# Patient Record
Sex: FEMALE | Race: WHITE | NOT HISPANIC OR LATINO | Employment: OTHER | URBAN - METROPOLITAN AREA
[De-identification: names, ages, dates, MRNs, and addresses within clinical notes are randomized per-mention and may not be internally consistent; named-entity substitution may affect disease eponyms.]

---

## 2017-01-31 ENCOUNTER — OFFICE VISIT (OUTPATIENT)
Dept: NEUROSURGERY | Facility: CLINIC | Age: 32
End: 2017-01-31

## 2017-01-31 VITALS
DIASTOLIC BLOOD PRESSURE: 80 MMHG | BODY MASS INDEX: 32.47 KG/M2 | WEIGHT: 172 LBS | HEART RATE: 80 BPM | HEIGHT: 61 IN | SYSTOLIC BLOOD PRESSURE: 124 MMHG

## 2017-01-31 DIAGNOSIS — G89.29 CHRONIC LOW BACK PAIN WITHOUT SCIATICA, UNSPECIFIED BACK PAIN LATERALITY: ICD-10-CM

## 2017-01-31 DIAGNOSIS — M54.50 CHRONIC LOW BACK PAIN WITHOUT SCIATICA, UNSPECIFIED BACK PAIN LATERALITY: ICD-10-CM

## 2017-01-31 DIAGNOSIS — M47.12 CERVICAL SPONDYLOSIS WITH MYELOPATHY: ICD-10-CM

## 2017-01-31 DIAGNOSIS — R32 INCONTINENCE: Primary | ICD-10-CM

## 2017-01-31 PROCEDURE — 99203 OFFICE O/P NEW LOW 30 MIN: CPT | Performed by: NEUROLOGICAL SURGERY

## 2017-01-31 RX ORDER — GABAPENTIN 300 MG/1
300 CAPSULE ORAL 3 TIMES DAILY
COMMUNITY
End: 2022-09-08

## 2017-01-31 RX ORDER — GLUCOSAMINE HCL 500 MG
TABLET ORAL
COMMUNITY
End: 2022-08-18

## 2017-01-31 RX ORDER — VENLAFAXINE 50 MG/1
50 TABLET ORAL 2 TIMES DAILY
COMMUNITY
End: 2022-08-18

## 2017-01-31 NOTE — MR AVS SNAPSHOT
Katie Villarreal   2017 1:00 PM   Office Visit    Dept Phone:  [phone]   Encounter #:  [id number]    Provider:  Harvey Pérez MD   Department:  [facility]                Your Full Care Plan              Your Updated Medication List          This list is accurate as of: 17  1:42 PM.  Always use your most recent med list.                cyclobenzaprine 10 MG tablet   Commonly known as:  FLEXERIL       diclofenac 50 MG EC tablet   Commonly known as:  VOLTAREN       gabapentin 300 MG capsule   Commonly known as:  NEURONTIN       venlafaxine 50 MG tablet   Commonly known as:  EFFEXOR       Vitamin D3 3000 UNITS tablet               You Were Diagnosed With        Codes Comments    Incontinence    -  Primary ICD-10-CM: R32  ICD-9-CM: 788.30     Cervical spondylosis with myelopathy     ICD-10-CM: M47.12  ICD-9-CM: 721.1     Chronic low back pain without sciatica, unspecified back pain laterality     ICD-10-CM: M54.5, G89.29  ICD-9-CM: 724.2, 338.29       Instructions     None    Patient Instructions History      Upcoming Appointments     Visit Type Date Time Department    NEW PATIENT 2017  1:00 PM [facility]    OFFICE VISIT 3/9/2017  9:45 AM [facility]      Image [employer] Signup     [employer] allows you to send messages to your doctor, view your test results, renew your prescriptions, schedule appointments, and more. To sign up, go to "[employer] and click on the Sign Up Now link in the New User? box. Enter your [employer] Activation Code exactly as it appears below along with the last four digits of your Social Security Number and your Date of Birth () to complete the sign-up process. If you do not sign up before the expiration date, you must request a new code.    [employer] Local Activation Code: [email or web address]  Expires: 2017  1:39 PM    If you have questions, you can email [email or web address] or call  "[phone] to talk to our MyChart staff. Remember, MyChart is NOT to be used for urgent needs. For medical emergencies, dial 911.               Other Info from Your Visit           Your Appointments     Mar 09, 2017  9:45 AM EST   Office Visit with ALCIRA Asencio   [facility] (--)    [address]  [facility] [address] [phone]           Arrive 15 minutes prior to appointment.              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

## 2017-01-31 NOTE — PROGRESS NOTES
Subjective   Patient ID: Katie Villarreal is a 31 y.o. female is being seen for consultation today at the request of ALCIRA Adame for low back pain. She is accompanied by her mother for this visit today.     History of Present Illness     It is a 31-year-old right-handed woman who noted the onset of low back pain when she had a fall slipping onto her buttocks.  She is also noted incontinence of urine that is not related specifically to physical activities.  She is also noted right hand and right toe numbness and tingling.    He denies problems like this prior to her fall about 6 months ago.  Her active low back pain is described as sharp pain.  The patient indicates that she has constant pain regardless of what activity she is doing.  Even being seated causes her pain.  No specific aggravating factors.  He feels as if he makes her back pain better.     Once that at times she has hesitancy and in fact inability to urinate while on the toilet.  She also notes at times just specific leakage of urine without a precipitating factor.  This is been going on for at least 6 months.    He is been treated with gabapentin.  Not had physical therapy.    Currently disabled with bipolar and mood disorders.    The following portions of the patient's history were reviewed and updated as appropriate: allergies, current medications, past family history, past medical history, past social history, past surgical history and problem list.    Review of Systems   Constitutional: Negative for chills and fever.   Respiratory: Negative for cough and shortness of breath.    Cardiovascular: Negative for chest pain, palpitations and leg swelling.   Gastrointestinal: Positive for constipation. Negative for abdominal pain.   Genitourinary: Positive for enuresis. Negative for difficulty urinating.   Musculoskeletal: Positive for back pain and gait problem. Negative for neck pain.   Skin: Negative for rash.   Neurological: Positive for  numbness (or tingling RLE). Negative for weakness.   Hematological: Does not bruise/bleed easily.   Psychiatric/Behavioral: Positive for sleep disturbance.       Objective   Physical Exam   Constitutional: She is oriented to person, place, and time. She appears well-developed and well-nourished. She is cooperative.   HENT:   Head: Normocephalic and atraumatic.   Eyes: EOM are normal. Pupils are equal, round, and reactive to light. No scleral icterus.   Neck: Normal range of motion. Neck supple.   Cardiovascular: Normal rate, regular rhythm and intact distal pulses.    No murmur heard.  Pulmonary/Chest: Effort normal and breath sounds normal.   Abdominal: Soft. Bowel sounds are normal. There is no tenderness.   Musculoskeletal: Normal range of motion.   Neurological: She is alert and oriented to person, place, and time. She has normal strength. She displays no atrophy. No cranial nerve deficit or sensory deficit. She exhibits normal muscle tone. She has a normal Finger-Nose-Finger Test, a normal Heel to Shin Test, a normal Romberg Test and a normal Tandem Gait Test. She displays a negative Romberg sign. Gait normal. Coordination and gait normal. GCS eye subscore is 4. GCS verbal subscore is 5. GCS motor subscore is 6.   Reflex Scores:       Tricep reflexes are 3+ on the right side and 3+ on the left side.       Bicep reflexes are 3+ on the right side and 3+ on the left side.       Brachioradialis reflexes are 3+ on the right side and 3+ on the left side.       Patellar reflexes are 3+ on the right side and 3+ on the left side.       Achilles reflexes are 3+ on the right side and 3+ on the left side.  Negative Guadarrama and clonus  Finger to nose intact   Heel to shin intact  Able to tandem walk  Able to walk on heels and toes   Skin: Skin is warm, dry and intact.   Psychiatric: She has a normal mood and affect. Her speech is normal and behavior is normal. Judgment and thought content normal. Cognition and memory are  normal.   Vitals reviewed.    Neurologic Exam     Mental Status   Oriented to person, place, and time.   Registration: recalls 3 of 3 objects. Recall at 5 minutes: recalls 3 of 3 objects. Follows 3 step commands.   Attention: normal. Concentration: normal.   Speech: speech is normal   Level of consciousness: alert  Knowledge: good.   Able to name object. Able to read. Able to repeat. Able to write. Normal comprehension.     Cranial Nerves     CN II   Visual fields full to confrontation.     CN III, IV, VI   Pupils are equal, round, and reactive to light.  Extraocular motions are normal.   Right pupil: Consensual response: intact.   Left pupil: Consensual response: intact.   CN III: no CN III palsy  CN VI: no CN VI palsy  Nystagmus: none   Diplopia: none  Ophthalmoparesis: none  Upgaze: normal  Downgaze: normal  Conjugate gaze: present  Vestibulo-ocular reflex: present    CN V   Facial sensation intact.     CN VII   Facial expression full, symmetric.     CN VIII   CN VIII normal.     CN IX, X   CN IX normal.     CN XI   CN XI normal.     CN XII   CN XII normal.     Motor Exam   Muscle bulk: normal  Overall muscle tone: normal  Right arm tone: normal  Left arm tone: normal  Right arm pronator drift: absent  Left arm pronator drift: absent  Right leg tone: normal  Left leg tone: normal    Strength   Strength 5/5 throughout.   Right neck flexion: 5/5  Left neck flexion: 5/5  Right neck extension: 5/5  Left neck extension: 5/5  Right deltoid: 5/5  Left deltoid: 5/5  Right biceps: 5/5  Left biceps: 5/5  Right triceps: 5/5  Left triceps: 5/5  Right wrist flexion: 5/5  Left wrist flexion: 5/5  Right wrist extension: 5/5  Left wrist extension: 5/5  Right interossei: 5/5  Left interossei: 5/5  Right abdominals: 5/5  Left abdominals: 5/5  Right iliopsoas: 5/5  Left iliopsoas: 5/5  Right quadriceps: 5/5  Left quadriceps: 5/5  Right hamstrin/5  Left hamstrin/5  Right glutei: 5/5  Left glutei: 5/5  Right anterior tibial:  5/5  Left anterior tibial: 5/5  Right posterior tibial: 5/5  Left posterior tibial: 5/5  Right peroneal: 5/5  Left peroneal: 5/5  Right gastroc: 5/5  Left gastroc: 5/5    Sensory Exam   Light touch normal.   Vibration normal.   Proprioception normal.   Pinprick normal.     Gait, Coordination, and Reflexes     Gait  Gait: normal    Coordination   Romberg: negative  Finger to nose coordination: normal  Heel to shin coordination: normal  Tandem walking coordination: normal    Tremor   Resting tremor: absent  Intention tremor: absent  Action tremor: absent    Reflexes   Right brachioradialis: 3+  Left brachioradialis: 3+  Right biceps: 3+  Left biceps: 3+  Right triceps: 3+  Left triceps: 3+  Right patellar: 3+  Left patellar: 3+  Right achilles: 3+  Left achilles: 3+  Right plantar: normal  Left plantar: normal  Right Guadarrama: present  Left Guadarrama: present  Right ankle clonus: absent  Left ankle clonus: absent       Highly antalgic gait entering the right leg able to toe heel and tandem walk       Assessment/Plan   Independent Review of Radiographic Studies:    I personally reviewed an MRI scan of lumbar spine that essentially is within normal limits.  I see no evidence of cauda equina compression or of significant disc disease or of neural foraminal or canal stenosis.  Medical Decision Making:    The patient presents with the above-noted history and physical findings.  She is very hyperreflexic and she has been disturbing symptom of urinary disorder.    I think that is reasonable and appropriate to reevaluate with MRI her thoracic and cervical spine.  These are negative then I will not have much to offer her.  If there is evidence of cord compression and of course would be appropriate to consider decompression.    Katei was seen today for back pain.    Diagnoses and all orders for this visit:    Incontinence  -     MRI Cervical Spine With & Without Contrast; Future  -     MRI thoracic spine w wo contrast;  Future    Cervical spondylosis with myelopathy  -     MRI Cervical Spine With & Without Contrast; Future  -     MRI thoracic spine w wo contrast; Future    Chronic low back pain without sciatica, unspecified back pain laterality    Return for Follow up after testing, Follow up with nurse practitioner.

## 2017-02-27 ENCOUNTER — APPOINTMENT (OUTPATIENT)
Dept: MRI IMAGING | Facility: HOSPITAL | Age: 32
End: 2017-02-27
Attending: NEUROLOGICAL SURGERY

## 2017-02-27 ENCOUNTER — HOSPITAL ENCOUNTER (OUTPATIENT)
Dept: MRI IMAGING | Facility: HOSPITAL | Age: 32
Discharge: HOME | End: 2017-02-27
Attending: NEUROLOGICAL SURGERY

## 2017-02-27 ENCOUNTER — HOSPITAL ENCOUNTER (OUTPATIENT)
Dept: MRI IMAGING | Facility: HOSPITAL | Age: 32
Discharge: HOME | End: 2017-02-27
Attending: NEUROLOGICAL SURGERY | Admitting: NEUROLOGICAL SURGERY

## 2017-02-27 DIAGNOSIS — R32 INCONTINENCE: ICD-10-CM

## 2017-02-27 DIAGNOSIS — M47.12 CERVICAL SPONDYLOSIS WITH MYELOPATHY: ICD-10-CM

## 2017-02-27 PROCEDURE — 0 GADOBENATE DIMEGLUMINE 529 MG/ML SOLUTION: Performed by: NEUROLOGICAL SURGERY

## 2017-02-27 PROCEDURE — 72156 MRI NECK SPINE W/O & W/DYE: CPT

## 2017-02-27 PROCEDURE — A9577 INJ MULTIHANCE: HCPCS | Performed by: NEUROLOGICAL SURGERY

## 2017-02-27 PROCEDURE — 72157 MRI CHEST SPINE W/O & W/DYE: CPT

## 2017-02-27 RX ADMIN — GADOBENATE DIMEGLUMINE 15 ML: 529 INJECTION, SOLUTION INTRAVENOUS at 11:21

## 2017-12-15 ENCOUNTER — CONVERSION ENCOUNTER (OUTPATIENT)
Dept: GENERAL RADIOLOGY | Facility: HOSPITAL | Age: 32
End: 2017-12-15

## 2019-03-04 ENCOUNTER — HOSPITAL ENCOUNTER (OUTPATIENT)
Dept: URGENT CARE | Facility: CLINIC | Age: 34
Discharge: HOME | End: 2019-03-04
Attending: PHYSICIAN ASSISTANT

## 2020-08-17 ENCOUNTER — HOSPITAL ENCOUNTER (OUTPATIENT)
Dept: GENERAL RADIOLOGY | Facility: HOSPITAL | Age: 35
Discharge: HOME | End: 2020-08-17
Attending: NURSE PRACTITIONER

## 2021-07-24 ENCOUNTER — HOSPITAL ENCOUNTER (EMERGENCY)
Facility: HOSPITAL | Age: 36
Discharge: ANOTHER HOSPITAL | End: 2021-07-24
Attending: EMERGENCY MEDICINE | Admitting: EMERGENCY MEDICINE

## 2021-07-24 ENCOUNTER — APPOINTMENT (OUTPATIENT)
Dept: GENERAL RADIOLOGY | Facility: HOSPITAL | Age: 36
End: 2021-07-24

## 2021-07-24 VITALS
WEIGHT: 128.31 LBS | BODY MASS INDEX: 23.61 KG/M2 | RESPIRATION RATE: 16 BRPM | TEMPERATURE: 98.1 F | DIASTOLIC BLOOD PRESSURE: 76 MMHG | OXYGEN SATURATION: 100 % | SYSTOLIC BLOOD PRESSURE: 114 MMHG | HEART RATE: 94 BPM | HEIGHT: 62 IN

## 2021-07-24 DIAGNOSIS — M65.9 TENOSYNOVITIS: Primary | ICD-10-CM

## 2021-07-24 LAB
ALBUMIN SERPL-MCNC: 4.3 G/DL (ref 3.5–5.2)
ALBUMIN/GLOB SERPL: 1.4 G/DL
ALP SERPL-CCNC: 72 U/L (ref 39–117)
ALT SERPL W P-5'-P-CCNC: 11 U/L (ref 1–33)
ANION GAP SERPL CALCULATED.3IONS-SCNC: 11.2 MMOL/L (ref 5–15)
ANISOCYTOSIS BLD QL: NORMAL
AST SERPL-CCNC: 17 U/L (ref 1–32)
BASOPHILS # BLD AUTO: 0.03 10*3/MM3 (ref 0–0.2)
BASOPHILS NFR BLD AUTO: 0.3 % (ref 0–1.5)
BILIRUB SERPL-MCNC: 0.6 MG/DL (ref 0–1.2)
BUN SERPL-MCNC: 9 MG/DL (ref 6–20)
BUN/CREAT SERPL: 12.7 (ref 7–25)
CALCIUM SPEC-SCNC: 9.1 MG/DL (ref 8.6–10.5)
CHLORIDE SERPL-SCNC: 100 MMOL/L (ref 98–107)
CO2 SERPL-SCNC: 25.8 MMOL/L (ref 22–29)
CREAT SERPL-MCNC: 0.71 MG/DL (ref 0.57–1)
DEPRECATED RDW RBC AUTO: 45.2 FL (ref 37–54)
EOSINOPHIL # BLD AUTO: 0.27 10*3/MM3 (ref 0–0.4)
EOSINOPHIL NFR BLD AUTO: 3.1 % (ref 0.3–6.2)
ERYTHROCYTE [DISTWIDTH] IN BLOOD BY AUTOMATED COUNT: 14.4 % (ref 12.3–15.4)
ERYTHROCYTE [SEDIMENTATION RATE] IN BLOOD: 9 MM/HR (ref 0–20)
GFR SERPL CREATININE-BSD FRML MDRD: 94 ML/MIN/1.73
GLOBULIN UR ELPH-MCNC: 3 GM/DL
GLUCOSE SERPL-MCNC: 96 MG/DL (ref 65–99)
HCT VFR BLD AUTO: 37.6 % (ref 34–46.6)
HGB BLD-MCNC: 12.8 G/DL (ref 12–15.9)
HYPOCHROMIA BLD QL: NORMAL
IMM GRANULOCYTES # BLD AUTO: 0.03 10*3/MM3 (ref 0–0.05)
IMM GRANULOCYTES NFR BLD AUTO: 0.3 % (ref 0–0.5)
LYMPHOCYTES # BLD AUTO: 2.7 10*3/MM3 (ref 0.7–3.1)
LYMPHOCYTES NFR BLD AUTO: 31.1 % (ref 19.6–45.3)
MACROCYTES BLD QL SMEAR: NORMAL
MCH RBC QN AUTO: 29.5 PG (ref 26.6–33)
MCHC RBC AUTO-ENTMCNC: 34 G/DL (ref 31.5–35.7)
MCV RBC AUTO: 86.6 FL (ref 79–97)
MONOCYTES # BLD AUTO: 0.67 10*3/MM3 (ref 0.1–0.9)
MONOCYTES NFR BLD AUTO: 7.7 % (ref 5–12)
NEUTROPHILS NFR BLD AUTO: 4.98 10*3/MM3 (ref 1.7–7)
NEUTROPHILS NFR BLD AUTO: 57.5 % (ref 42.7–76)
NRBC BLD AUTO-RTO: 0 /100 WBC (ref 0–0.2)
PLATELET # BLD AUTO: 325 10*3/MM3 (ref 140–450)
PMV BLD AUTO: 10.4 FL (ref 6–12)
POIKILOCYTOSIS BLD QL SMEAR: NORMAL
POTASSIUM SERPL-SCNC: 3.6 MMOL/L (ref 3.5–5.2)
PROT SERPL-MCNC: 7.3 G/DL (ref 6–8.5)
RBC # BLD AUTO: 4.34 10*6/MM3 (ref 3.77–5.28)
SMALL PLATELETS BLD QL SMEAR: ADEQUATE
SODIUM SERPL-SCNC: 137 MMOL/L (ref 136–145)
TARGETS BLD QL SMEAR: NORMAL
WBC # BLD AUTO: 8.68 10*3/MM3 (ref 3.4–10.8)
WBC MORPH BLD: NORMAL

## 2021-07-24 PROCEDURE — 99283 EMERGENCY DEPT VISIT LOW MDM: CPT

## 2021-07-24 PROCEDURE — 80053 COMPREHEN METABOLIC PANEL: CPT | Performed by: NURSE PRACTITIONER

## 2021-07-24 PROCEDURE — 96376 TX/PRO/DX INJ SAME DRUG ADON: CPT

## 2021-07-24 PROCEDURE — 85652 RBC SED RATE AUTOMATED: CPT | Performed by: NURSE PRACTITIONER

## 2021-07-24 PROCEDURE — 25010000002 TDAP 5-2.5-18.5 LF-MCG/0.5 SUSPENSION: Performed by: NURSE PRACTITIONER

## 2021-07-24 PROCEDURE — 87070 CULTURE OTHR SPECIMN AEROBIC: CPT | Performed by: NURSE PRACTITIONER

## 2021-07-24 PROCEDURE — 90715 TDAP VACCINE 7 YRS/> IM: CPT | Performed by: NURSE PRACTITIONER

## 2021-07-24 PROCEDURE — 96375 TX/PRO/DX INJ NEW DRUG ADDON: CPT

## 2021-07-24 PROCEDURE — 90471 IMMUNIZATION ADMIN: CPT | Performed by: NURSE PRACTITIONER

## 2021-07-24 PROCEDURE — 87077 CULTURE AEROBIC IDENTIFY: CPT | Performed by: NURSE PRACTITIONER

## 2021-07-24 PROCEDURE — 25010000002 MORPHINE PER 10 MG: Performed by: EMERGENCY MEDICINE

## 2021-07-24 PROCEDURE — 25010000002 ONDANSETRON PER 1 MG: Performed by: EMERGENCY MEDICINE

## 2021-07-24 PROCEDURE — 25010000002 PIPERACILLIN SOD-TAZOBACTAM PER 1 G: Performed by: NURSE PRACTITIONER

## 2021-07-24 PROCEDURE — 85007 BL SMEAR W/DIFF WBC COUNT: CPT | Performed by: NURSE PRACTITIONER

## 2021-07-24 PROCEDURE — 96365 THER/PROPH/DIAG IV INF INIT: CPT

## 2021-07-24 PROCEDURE — 85025 COMPLETE CBC W/AUTO DIFF WBC: CPT | Performed by: NURSE PRACTITIONER

## 2021-07-24 PROCEDURE — 87205 SMEAR GRAM STAIN: CPT | Performed by: NURSE PRACTITIONER

## 2021-07-24 PROCEDURE — 73130 X-RAY EXAM OF HAND: CPT

## 2021-07-24 RX ORDER — ONDANSETRON 2 MG/ML
4 INJECTION INTRAMUSCULAR; INTRAVENOUS ONCE
Status: COMPLETED | OUTPATIENT
Start: 2021-07-24 | End: 2021-07-24

## 2021-07-24 RX ADMIN — ONDANSETRON 4 MG: 2 INJECTION INTRAMUSCULAR; INTRAVENOUS at 14:12

## 2021-07-24 RX ADMIN — MORPHINE SULFATE 4 MG: 4 INJECTION, SOLUTION INTRAMUSCULAR; INTRAVENOUS at 16:02

## 2021-07-24 RX ADMIN — SODIUM CHLORIDE 1000 ML: 9 INJECTION, SOLUTION INTRAVENOUS at 12:17

## 2021-07-24 RX ADMIN — TAZOBACTAM SODIUM AND PIPERACILLIN SODIUM 3.38 G: 375; 3 INJECTION, SOLUTION INTRAVENOUS at 12:18

## 2021-07-24 RX ADMIN — MORPHINE SULFATE 4 MG: 4 INJECTION, SOLUTION INTRAMUSCULAR; INTRAVENOUS at 14:12

## 2021-07-24 RX ADMIN — TETANUS TOXOID, REDUCED DIPHTHERIA TOXOID AND ACELLULAR PERTUSSIS VACCINE, ADSORBED 0.5 ML: 5; 2.5; 8; 8; 2.5 SUSPENSION INTRAMUSCULAR at 14:13

## 2021-07-24 NOTE — ED PROVIDER NOTES
Subjective   Patient presents to the ER for a dog bite to right hand.    This occurred two days ago. The dog was a stray dog that was bothering her dog. She tried to wave the dog off and sustained a bite.     The area is now draining. She has not been seen for this.     Unknown last tetnus.    Denies fever.           Review of Systems   Constitutional: Negative for chills and fever.   HENT: Negative for congestion, ear pain and sore throat.    Eyes: Negative for pain.   Respiratory: Negative for cough, chest tightness and shortness of breath.    Cardiovascular: Negative for chest pain.   Gastrointestinal: Negative for abdominal pain, diarrhea, nausea and vomiting.   Genitourinary: Negative for flank pain and hematuria.   Musculoskeletal: Positive for myalgias. Negative for joint swelling.   Skin: Negative for pallor.   Neurological: Negative for seizures and headaches.   All other systems reviewed and are negative.      Past Medical History:   Diagnosis Date   • Anxiety    • Depression    • Lumbar back pain    • Spleen anomaly        No Known Allergies    Past Surgical History:   Procedure Laterality Date   • SPLENECTOMY         Family History   Problem Relation Age of Onset   • Diabetes Maternal Grandmother    • Heart disease Maternal Grandmother    • Cancer Maternal Grandfather    • Heart disease Maternal Grandfather        Social History     Socioeconomic History   • Marital status: Single     Spouse name: Not on file   • Number of children: Not on file   • Years of education: Not on file   • Highest education level: Not on file   Tobacco Use   • Smoking status: Current Every Day Smoker     Packs/day: 1.00     Years: 15.00     Pack years: 15.00   Substance and Sexual Activity   • Alcohol use: No           Objective   Physical Exam  Musculoskeletal:        Arms:          Procedures           ED Course                                           MDM  Number of Diagnoses or Management Options  Tenosynovitis  Diagnosis  management comments: Patient presents to the ER after sustaining an injury from a dog to the volar, proximal 4th digit. She was not seen by a provider and has an open wound.    Flexor tenosynitis present.    Blood work does not show any significant findings.    Antibioitics, Tdap and pain medication in the ER.     Dr. Belcher evaluated Patient and agrees with plan of care.    I consulted [facility]. Patient will be transferred and accepting physician on the EMTLA.        Amount and/or Complexity of Data Reviewed  Clinical lab tests: reviewed  Tests in the radiology section of CPT®: reviewed        Final diagnoses:   Tenosynovitis       ED Disposition  ED Disposition     ED Disposition Condition Comment    Transfer to Another Facility   Patient is being transferred to [facility]. Patient is traveling POV.           No follow-up provider specified.       Medication List      No changes were made to your prescriptions during this visit.          Caroline Perry, ALCIRA  07/25/21 1127

## 2021-07-24 NOTE — ED PROVIDER NOTES
"Subjective   Patient is a 36-yo female who presents to the ED with c/o animal bite 2 days ago to her left hand.  This was a provoked attack as the patient states that she tried to \"shoe\" the dog away.          Review of Systems   Constitutional: Negative for chills and fever.   HENT: Negative for nosebleeds.    Eyes: Negative for redness.   Respiratory: Negative for cough and shortness of breath.    Cardiovascular: Negative for chest pain.   Gastrointestinal: Negative for diarrhea and vomiting.   Genitourinary: Negative for dysuria and frequency.   Musculoskeletal: Negative for back pain and neck pain.   Skin: Negative for rash.   Neurological: Negative for seizures and syncope.   All other systems reviewed and are negative.      Past Medical History:   Diagnosis Date   • Anxiety    • Depression    • Lumbar back pain    • Spleen anomaly        No Known Allergies    Past Surgical History:   Procedure Laterality Date   • SPLENECTOMY         Family History   Problem Relation Age of Onset   • Diabetes Maternal Grandmother    • Heart disease Maternal Grandmother    • Cancer Maternal Grandfather    • Heart disease Maternal Grandfather        Social History     Socioeconomic History   • Marital status: Single     Spouse name: Not on file   • Number of children: Not on file   • Years of education: Not on file   • Highest education level: Not on file   Tobacco Use   • Smoking status: Current Every Day Smoker     Packs/day: 1.00     Years: 15.00     Pack years: 15.00   Substance and Sexual Activity   • Alcohol use: No         Objective   Physical Exam  Vitals and nursing note reviewed.   Constitutional:       General: She is awake. She is not in acute distress.  HENT:      Head: Normocephalic and atraumatic.      Nose: Nose normal.      Mouth/Throat:      Mouth: Mucous membranes are moist.   Eyes:      General: No scleral icterus.     Extraocular Movements: Extraocular movements intact.      Conjunctiva/sclera: Conjunctivae " normal.      Pupils: Pupils are equal, round, and reactive to light.   Cardiovascular:      Rate and Rhythm: Normal rate and regular rhythm.      Pulses: Normal pulses.      Heart sounds: Normal heart sounds. No murmur heard.     Pulmonary:      Effort: Pulmonary effort is normal. No respiratory distress.      Breath sounds: Normal breath sounds and air entry. No decreased air movement. No decreased breath sounds, wheezing, rhonchi or rales.   Chest:      Chest wall: No tenderness.   Abdominal:      Palpations: Abdomen is soft.      Tenderness: There is no abdominal tenderness. There is no guarding or rebound.      Hernia: No hernia is present.   Musculoskeletal:         General: No tenderness. Normal range of motion.      Left hand: Swelling and laceration present.      Cervical back: Normal range of motion and neck supple. No tenderness.      Right lower leg: No edema.      Left lower leg: No edema.      Comments: Laceration to left dip joint on palm side. Thick drainage and swelling. Pain with active flexion and extension. Will not attempt passive flexion or extension. Redness to palm and wrist. Warmth. Tenderness.    Skin:     General: Skin is warm and dry.      Findings: No rash.   Neurological:      General: No focal deficit present.      Mental Status: She is alert and oriented to person, place, and time. Mental status is at baseline.      Sensory: Sensation is intact. No sensory deficit.      Motor: Motor function is intact. No weakness.   Psychiatric:         Behavior: Behavior normal.         Procedures         ED Course                                            MDM    Final diagnoses:   Tenosynovitis       Documentation assistance provided by tanika Menard.  Information recorded by the tanika was done at my direction and has been verified and validated by me.     Vero Menard  07/24/21 1319       Aaron Belcher DO  07/24/21 4254

## 2021-07-27 LAB
BACTERIA SPEC AEROBE CULT: ABNORMAL
GRAM STN SPEC: ABNORMAL

## 2022-09-08 PROCEDURE — 87660 TRICHOMONAS VAGIN DIR PROBE: CPT | Performed by: NURSE PRACTITIONER

## 2022-09-08 PROCEDURE — 87086 URINE CULTURE/COLONY COUNT: CPT | Performed by: NURSE PRACTITIONER

## 2022-09-08 PROCEDURE — 87491 CHLMYD TRACH DNA AMP PROBE: CPT | Performed by: NURSE PRACTITIONER

## 2022-09-08 PROCEDURE — 87510 GARDNER VAG DNA DIR PROBE: CPT | Performed by: NURSE PRACTITIONER

## 2022-09-08 PROCEDURE — 87591 N.GONORRHOEAE DNA AMP PROB: CPT | Performed by: NURSE PRACTITIONER

## 2022-09-08 PROCEDURE — U0004 COV-19 TEST NON-CDC HGH THRU: HCPCS | Performed by: NURSE PRACTITIONER

## 2022-09-08 PROCEDURE — 87480 CANDIDA DNA DIR PROBE: CPT | Performed by: NURSE PRACTITIONER

## 2022-09-09 ENCOUNTER — TELEPHONE (OUTPATIENT)
Dept: URGENT CARE | Facility: CLINIC | Age: 37
End: 2022-09-09

## 2022-09-09 DIAGNOSIS — B96.89 BACTERIAL VAGINOSIS: Primary | ICD-10-CM

## 2022-09-09 DIAGNOSIS — N76.0 BACTERIAL VAGINOSIS: Primary | ICD-10-CM

## 2022-09-09 RX ORDER — METRONIDAZOLE 500 MG/1
500 TABLET ORAL 2 TIMES DAILY
Qty: 14 TABLET | Refills: 0 | Status: SHIPPED | OUTPATIENT
Start: 2022-09-09 | End: 2022-09-09

## 2022-09-09 RX ORDER — METRONIDAZOLE 500 MG/1
500 TABLET ORAL 2 TIMES DAILY
Qty: 14 TABLET | Refills: 0 | Status: SHIPPED | OUTPATIENT
Start: 2022-09-09 | End: 2022-09-16

## 2022-09-12 ENCOUNTER — HOSPITAL ENCOUNTER (EMERGENCY)
Facility: HOSPITAL | Age: 37
Discharge: HOME | End: 2022-09-12
Attending: EMERGENCY MEDICINE | Admitting: EMERGENCY MEDICINE

## 2022-09-12 VITALS
WEIGHT: 146 LBS | SYSTOLIC BLOOD PRESSURE: 114 MMHG | BODY MASS INDEX: 26.87 KG/M2 | OXYGEN SATURATION: 100 % | TEMPERATURE: 98.1 F | HEART RATE: 88 BPM | DIASTOLIC BLOOD PRESSURE: 69 MMHG | HEIGHT: 62 IN | RESPIRATION RATE: 20 BRPM

## 2022-09-12 DIAGNOSIS — S01.511A LACERATION OF VERMILION BORDER OF UPPER LIP WITHOUT COMPLICATION, INITIAL ENCOUNTER: ICD-10-CM

## 2022-09-12 DIAGNOSIS — S01.511A LIP LACERATION, INITIAL ENCOUNTER: Primary | ICD-10-CM

## 2022-09-12 PROCEDURE — 99283 EMERGENCY DEPT VISIT LOW MDM: CPT

## 2022-09-12 PROCEDURE — 63710000001 ONDANSETRON ODT 4 MG TABLET DISPERSIBLE: Performed by: EMERGENCY MEDICINE

## 2022-09-12 RX ORDER — HYDROCODONE BITARTRATE AND ACETAMINOPHEN 5; 325 MG/1; MG/1
1 TABLET ORAL EVERY 6 HOURS PRN
Status: DISCONTINUED | OUTPATIENT
Start: 2022-09-12 | End: 2022-09-12 | Stop reason: HOSPADM

## 2022-09-12 RX ORDER — LIDOCAINE HYDROCHLORIDE AND EPINEPHRINE 10; 10 MG/ML; UG/ML
10 INJECTION, SOLUTION INFILTRATION; PERINEURAL ONCE
Status: COMPLETED | OUTPATIENT
Start: 2022-09-12 | End: 2022-09-12

## 2022-09-12 RX ORDER — GINSENG 100 MG
1 CAPSULE ORAL ONCE
Status: COMPLETED | OUTPATIENT
Start: 2022-09-12 | End: 2022-09-12

## 2022-09-12 RX ORDER — ONDANSETRON 4 MG/1
4 TABLET, ORALLY DISINTEGRATING ORAL ONCE
Status: COMPLETED | OUTPATIENT
Start: 2022-09-12 | End: 2022-09-12

## 2022-09-12 RX ADMIN — Medication 1 APPLICATION: at 04:47

## 2022-09-12 RX ADMIN — HYDROCODONE BITARTRATE AND ACETAMINOPHEN 1 TABLET: 5; 325 TABLET ORAL at 04:47

## 2022-09-12 RX ADMIN — LIDOCAINE HYDROCHLORIDE AND EPINEPHRINE 10 ML: 10; 10 INJECTION, SOLUTION INFILTRATION; PERINEURAL at 04:47

## 2022-09-12 RX ADMIN — ONDANSETRON 4 MG: 4 TABLET, ORALLY DISINTEGRATING ORAL at 04:47

## 2022-09-12 NOTE — ED PROVIDER NOTES
Subjective   PIT    The patient presents to the emergency department with a proximately a 1.5 cm laceration to the right upper lip.  It is through the vermilion border.  It is gapped wide.  The patient states that she was running up some steps when she tripped and hit her mouth on a plate she was carrying when it broke. She is able to bite down without any difficulties and discomfort.  She is able to open her mouth completely and close it without any difficulties.  She is able to stick her tongue out without any problem.  She states she does not feel as if she has any loose teeth or any other injuries.  Her airway is patent.  She states she is up-to-date with her immunizations.  The bleeding is controlled.  There is no obvious bruising at this time.  She states that she did not have any loss of consciousness.      History provided by:  Patient   used: No        Review of Systems   Constitutional: Negative for chills and fever.   HENT: Negative for congestion, ear pain and sore throat.    Eyes: Negative for pain.   Respiratory: Negative for cough, chest tightness and shortness of breath.    Cardiovascular: Negative for chest pain.   Gastrointestinal: Negative for abdominal pain, diarrhea, nausea and vomiting.   Genitourinary: Negative for flank pain and hematuria.   Musculoskeletal: Negative for joint swelling.   Skin: Positive for wound. Negative for pallor.   Neurological: Negative for seizures and headaches.   All other systems reviewed and are negative.      Past Medical History:   Diagnosis Date   • Anxiety    • Bipolar 1 disorder (HCC)    • Depression    • Lumbar back pain    • Spleen anomaly        No Known Allergies    Past Surgical History:   Procedure Laterality Date   • SPLENECTOMY     • TUBAL ABDOMINAL LIGATION         Family History   Problem Relation Age of Onset   • Diabetes Maternal Grandmother    • Heart disease Maternal Grandmother    • Cancer Maternal Grandfather    • Heart  disease Maternal Grandfather        Social History     Socioeconomic History   • Marital status: Single   Tobacco Use   • Smoking status: Current Every Day Smoker     Packs/day: 1.00     Years: 15.00     Pack years: 15.00   • Smokeless tobacco: Never Used   Vaping Use   • Vaping Use: Never used   Substance and Sexual Activity   • Alcohol use: No   • Drug use: Never           Objective   Physical Exam  Vitals and nursing note reviewed.   Constitutional:       General: She is not in acute distress.     Appearance: Normal appearance. She is not ill-appearing or toxic-appearing.   HENT:      Head: Normocephalic.      Mouth/Throat:      Lips: Pink.      Mouth: Mucous membranes are moist. Injury and lacerations present.     Eyes:      General: No scleral icterus.  Cardiovascular:      Rate and Rhythm: Normal rate and regular rhythm.      Pulses: Normal pulses.   Pulmonary:      Effort: Pulmonary effort is normal.   Abdominal:      General: Abdomen is flat.   Musculoskeletal:         General: No swelling or tenderness. Normal range of motion.      Cervical back: Normal range of motion and neck supple. No rigidity or tenderness.      Right lower leg: No edema.      Left lower leg: No edema.   Lymphadenopathy:      Cervical: No cervical adenopathy.   Skin:     General: Skin is warm and dry.      Capillary Refill: Capillary refill takes less than 2 seconds.      Findings: No rash.   Neurological:      General: No focal deficit present.      Mental Status: She is alert and oriented to person, place, and time. Mental status is at baseline.   Psychiatric:         Mood and Affect: Mood normal.         Behavior: Behavior normal.         Laceration Repair    Date/Time: 9/12/2022 5:00 AM  Performed by: Kisha Das APRN  Authorized by: Sejal Paul MD     Consent:     Consent obtained:  Verbal    Consent given by:  Patient    Risks, benefits, and alternatives were discussed: yes      Risks discussed:  Infection, pain and  poor cosmetic result    Alternatives discussed:  No treatment  Universal protocol:     Procedure explained and questions answered to patient or proxy's satisfaction: yes      Relevant documents present and verified: no      Test results available: no      Imaging studies available: no      Required blood products, implants, devices, and special equipment available: no      Site/side marked: no      Immediately prior to procedure, a time out was called: no      Patient identity confirmed:  Verbally with patient and arm band  Anesthesia:     Anesthesia method:  Local infiltration    Local anesthetic:  Lidocaine 1% WITH epi  Laceration details:     Location:  Lip  Pre-procedure details:     Preparation:  Patient was prepped and draped in usual sterile fashion  Exploration:     Limited defect created (wound extended): no      Hemostasis achieved with:  Direct pressure    Wound exploration: wound explored through full range of motion and entire depth of wound visualized      Contaminated: no    Post-procedure details:     Procedure completion:  Procedure terminated at patient's request  Comments:      The patient was informed, prior to the anesthetizing, that the numbing procedure would burn and feel like a bee sting for about 2 minutes.  After becoming upset and screaming, crying and cursing the patient requested to have the procedure stopped.  The procedure was terminated after 1 small lidocaine injection into her upper right lip.               ED Course  ED Course as of 09/12/22 0820   Mon Sep 12, 2022   0621 I was attempting to anesthetize the patient's right upper lip laceration.  I injected 1 time with a minute amount of lidocaine to the laceration.  The patient immediately began crying and shaking and screaming.  I tried to explain to her that we could numb it all at 1 time it would quit hurting within 2 minutes.  The patient then asked to speak with my boss and stated that I did not care about her and that she  "needed to be \"knocked out\" for the procedure.  I tried to explain to her that there are complications with  sedation, and this was a minor lack repair that could be done with probably 4 or 5 sutures.  The patient continued to scream and yell.  I exited the room and ask JENNA Dodge to go speak with the patient. [TC]   1334 JENNA Delaney went back in the room with me to speak with the patient concerning her lac repair.  I again offered to suture her lac.  She was informed that the oncoming provider at 7:00 could come and do perform her repai  but that there would still not be any additional pain narcotic pain medications or Valium administered.  The patient did not want to be seen and said she would go somewhere else.  Her significant other convinced her to stay here for the repair.  I did explain to her that she would have to cooperate.  Then the patient began making things up that I had thrown things at her in the room prior to leaving earlier.  She began screaming and sit up in the bed yelling and cussing calling me a bitch.  I exited the room.  [TC]   0627 JENNA Delaney return and stated the patient wanted to be sutured now and states she just wants to get it done and leave.  I will not continue to take care of this patient.  The 7 AM midlevel can choose to suture her if she wants. [TC]   0785 The patient has continued to scream and yell at staff and curse.  She has now told the charge nurse that \"you are not doing your fucking job\".  Dr. Paul went in to explain to the patient that it is not appropriate to disrespect the staff and speak to them in this manner.  The patient then asked her who she was to tell her what to do.  The patient will be discharged to follow-up somewhere else for her lac repair. [TC]      ED Course User Index  [TC] Kisha Das, ALCIRA                                           MDM  Number of Diagnoses or Management Options  Laceration of vermilion border of upper lip without " complication, initial encounter: minor  Lip laceration, initial encounter: minor  Risk of Complications, Morbidity, and/or Mortality  Presenting problems: low  Diagnostic procedures: low  Management options: low    Patient Progress  Patient progress: stable      Final diagnoses:   Lip laceration, initial encounter   Laceration of vermilion border of upper lip without complication, initial encounter       ED Disposition  ED Disposition     ED Disposition   Discharge    Condition   Stable    Comment   --             No follow-up provider specified.       Medication List      No changes were made to your prescriptions during this visit.          Kisha Das APRN  09/12/22 0801       Kisha Das APRN  09/12/22 0819       Kisha Das APRN  09/12/22 0820

## 2022-09-12 NOTE — ED NOTES
"Was asked to come and speak with patient by provider due to patient upset, yelling and cussing and upon patient request. This RN came to bedside to speak with patient. Patient upset due to feeling like the provider was rude. Patient asked if she could have additional pain medication or sedative for her lip laceration repair. Patient informed that I would speak with provider about additional pain medication and would inform provider of wanting a sedative. This RN went to provider and provider stated that patient had already received pain medication and that we would not being doing a sedation for simple laceration repair due to the risks that are involved and for patient safety. This RN went to explain to the patient why we would not be giving a sedative as per provider explanation and explained that she had already been given pain medication. Patient still upset and raising voice at that time. Patient visitor asked patient what she wanted to do. This RN informed patient that a new provider would be coming in a few minutes if she wanted to have another provider perform the laceration repair. Patient and visitor at that time decided that they just wanted to \"get it over with\" and stated that they were okay with original provider performing the laceration repair. This RN informed the provider and provider had this RN come with them to bedside to speak with the patient. Patient still upset and escalated started to yell at provider at that time when discussing as to why she felt the provider was rude. Provider left bedside at that time due to patient escalation. Patient continued to yell at that time. Patient also asked to get the \"number to head of the hospital\". This RN called  and was informed to give patient the Patient Advocate number. This RN wrote down \"Patient Advocate\" and their phone number as read off by [facility] [place] along with Providers name as requested. Went to give the information to the " "patient and informed them that a new provider would be coming in to do the laceration repair. Patient yelled at this RN \"I don't know why you can't just do your fucking job\" after again explaining why we don't sedate for simple laceration repair after patient repeated not understanding why we would not sedate. Patient also upset because she wanted something to eat and I informed her that I would give her some food after laceration repair so that the lip laceration could stay clean prior to procedure. Patient continued to yell, this RN left the room and Dr. Paul made aware and went to speak to patient at that time. Patient then continued to yell at Dr. Paul at that time.   "

## 2022-09-12 NOTE — DISCHARGE INSTRUCTIONS
Seek medical attention somewhere where you can get your lip laceration repaired.  Use pressure as necessary for bleeding.  Follow-up with your family doctor as needed.

## 2022-09-12 NOTE — ED PROVIDER NOTES
When nurse practitioner try to numb up the area she started yelling and cussing at her.  Provider left the room.  She went back in and attempted again and patient again started yelling and cussing at her.  Charge nurse also try to talk to patient and she treated her the same. At this time patient is refusing care, disrespecting staff and disrupting care of other patients.  I also attempted to talk to the patient she started screaming.  I did try to explain to the patient that this behavior is not going to be tolerated.  Patient was asked to leave.     Sejal Paul MD  09/12/22 9023

## 2023-04-11 ENCOUNTER — HOSPITAL ENCOUNTER (EMERGENCY)
Facility: HOSPITAL | Age: 38
Discharge: HOME | End: 2023-04-11
Attending: EMERGENCY MEDICINE | Admitting: EMERGENCY MEDICINE
Payer: COMMERCIAL

## 2023-04-11 VITALS
HEIGHT: 62 IN | DIASTOLIC BLOOD PRESSURE: 76 MMHG | BODY MASS INDEX: 25.56 KG/M2 | TEMPERATURE: 98.5 F | OXYGEN SATURATION: 100 % | SYSTOLIC BLOOD PRESSURE: 123 MMHG | WEIGHT: 138.89 LBS | RESPIRATION RATE: 18 BRPM | HEART RATE: 100 BPM

## 2023-04-11 DIAGNOSIS — R23.8 SKIN PIMPLE: Primary | ICD-10-CM

## 2023-04-11 PROCEDURE — 99282 EMERGENCY DEPT VISIT SF MDM: CPT

## 2023-04-12 NOTE — DISCHARGE INSTRUCTIONS
Put warm compresses on it for the pain as needed, Tylenol or Motrin.  Should be gone within a week

## 2023-04-12 NOTE — ED PROVIDER NOTES
Time: 8:54 PM EDT  Date of encounter:  4/11/2023  Independent Historian/Clinical History and Information was obtained by:   Patient  Chief Complaint   Patient presents with   • Headache       History is limited by: N/A    History of Present Illness:  Patient is a 37 y.o. year old female who presents to the emergency department for evaluation of a bump to the left side of the temporal region x1 day.  Patient states that her friends look at it and told her that it was a sore and is can get checked out.  She denies trauma to the head.    HPI    Patient Care Team  Primary Care Provider: Provider, No Known    Past Medical History:     No Known Allergies  Past Medical History:   Diagnosis Date   • Anxiety    • Bipolar 1 disorder    • Depression    • Lumbar back pain    • PTSD (post-traumatic stress disorder)    • Spleen anomaly      Past Surgical History:   Procedure Laterality Date   • SPLENECTOMY     • TUBAL ABDOMINAL LIGATION       Family History   Problem Relation Age of Onset   • Diabetes Maternal Grandmother    • Heart disease Maternal Grandmother    • Cancer Maternal Grandfather    • Heart disease Maternal Grandfather        Home Medications:  Prior to Admission medications    Not on File        Social History:   Social History     Tobacco Use   • Smoking status: Every Day     Packs/day: 1.00     Years: 15.00     Pack years: 15.00     Types: Cigarettes   • Smokeless tobacco: Never   Vaping Use   • Vaping Use: Never used   Substance Use Topics   • Alcohol use: No   • Drug use: Never         Review of Systems:  Review of Systems   Constitutional: Negative.    HENT: Negative.    Eyes: Negative.    Respiratory: Negative.    Cardiovascular: Negative.    Gastrointestinal: Negative.    Endocrine: Negative.    Genitourinary: Negative.    Musculoskeletal: Negative.    Skin: Positive for color change (mild erythema).   Allergic/Immunologic: Negative.    Neurological: Negative.    Hematological: Negative.   "  Psychiatric/Behavioral: Negative.         Physical Exam:  /76 (BP Location: Left arm, Patient Position: Sitting)   Pulse 100   Temp 98.5 °F (36.9 °C) (Oral)   Resp 18   Ht 157.5 cm (62\")   Wt 63 kg (138 lb 14.2 oz)   SpO2 100%   BMI 25.40 kg/m²     Physical Exam  Vitals and nursing note reviewed.   Constitutional:       Appearance: Normal appearance. She is normal weight.   HENT:      Head: Normocephalic and atraumatic.        Nose: Nose normal.      Mouth/Throat:      Mouth: Mucous membranes are moist.   Eyes:      Extraocular Movements: Extraocular movements intact.      Conjunctiva/sclera: Conjunctivae normal.      Pupils: Pupils are equal, round, and reactive to light.   Cardiovascular:      Rate and Rhythm: Normal rate and regular rhythm.      Heart sounds: Normal heart sounds.   Pulmonary:      Effort: Pulmonary effort is normal.      Breath sounds: Normal breath sounds.   Musculoskeletal:         General: Normal range of motion.      Cervical back: Normal range of motion and neck supple.   Skin:     General: Skin is warm and dry.      Findings: Erythema (mild) present.   Neurological:      General: No focal deficit present.      Mental Status: She is alert and oriented to person, place, and time.   Psychiatric:         Mood and Affect: Mood normal.         Behavior: Behavior normal.                  Procedures:  Procedures      Medical Decision Making:      Comorbidities that affect care:    None    External Notes reviewed:    None      The following orders were placed and all results were independently analyzed by me:  No orders of the defined types were placed in this encounter.      Medications Given in the Emergency Department:  Medications - No data to display     ED Course:    The patient was initially evaluated in the triage area where orders were placed. The patient was later dispositioned by Tania Rodriguez PA-C.      The patient was advised to stay for completion of workup which " includes but is not limited to communication of labs and radiological results, reassessment and plan. The patient was advised that leaving prior to disposition by a provider could result in critical findings that are not communicated to the patient.          Labs:    Lab Results (last 24 hours)     ** No results found for the last 24 hours. **           Imaging:    No Radiology Exams Resulted Within Past 24 Hours      Differential Diagnosis and Discussion:      Headache: Differential diagnosis includes but is not limited to migraine, cluster headache, hypertension, tumor, subarachnoid bleeding, pseudotumor cerebri, temporal arteritis, infections, tension headache, and TMJ syndrome.  Rash: Differential diagnosis includes but is not limited to sepsis, cellulitis, [place] Spotted Fever, meningitis, meningococcemia, Varicella, Strep infection, dermatitis, allergic reaction, Lyme disease, and toxic shock syndrome.    [facility]     Patient Care Considerations:    CT HEAD: I considered ordering a noncontrast CT of the head, however No trauma or injury to the head.  Seems to be an ingrown hair or pimple      Consultants/Shared Management Plan:    None    Social Determinants of Health:    Patient is independent, reliable, and has access to care.       Disposition and Care Coordination:    Discharged: The patient is suitable and stable for discharge with no need for consideration of observation or admission.    I have explained discharge medications and the need for follow up with the patient/caretakers. This was also printed in the discharge instructions. Patient was discharged with the following medications and follow up:      Medication List      No changes were made to your prescriptions during this visit.      No follow-up provider specified.     Final diagnoses:   Skin pimple (head)        ED Disposition     ED Disposition   Discharge    Condition   Stable    Comment   --             This medical record created using  voice recognition software.           Tania Rodriguez PA-C  04/11/23 [id number]

## 2023-06-15 ENCOUNTER — APPOINTMENT (OUTPATIENT)
Dept: CT IMAGING | Facility: HOSPITAL | Age: 38
End: 2023-06-15
Payer: COMMERCIAL

## 2023-06-15 ENCOUNTER — APPOINTMENT (OUTPATIENT)
Dept: GENERAL RADIOLOGY | Facility: HOSPITAL | Age: 38
End: 2023-06-15
Payer: COMMERCIAL

## 2023-06-15 ENCOUNTER — HOSPITAL ENCOUNTER (OUTPATIENT)
Facility: HOSPITAL | Age: 38
Setting detail: OBSERVATION
Discharge: LEFT BEFORE DISCHARGE | End: 2023-06-16
Attending: EMERGENCY MEDICINE | Admitting: STUDENT IN AN ORGANIZED HEALTH CARE EDUCATION/TRAINING PROGRAM
Payer: COMMERCIAL

## 2023-06-15 DIAGNOSIS — R26.2 DIFFICULTY WALKING: ICD-10-CM

## 2023-06-15 DIAGNOSIS — J98.2 PNEUMOMEDIASTINUM: Primary | ICD-10-CM

## 2023-06-15 DIAGNOSIS — R13.10 DYSPHAGIA, UNSPECIFIED TYPE: ICD-10-CM

## 2023-06-15 PROCEDURE — 73030 X-RAY EXAM OF SHOULDER: CPT

## 2023-06-15 PROCEDURE — 25010000002 NALOXONE PER 1 MG: Performed by: PHYSICIAN ASSISTANT

## 2023-06-15 PROCEDURE — 70498 CT ANGIOGRAPHY NECK: CPT

## 2023-06-15 PROCEDURE — 25510000001 IOPAMIDOL PER 1 ML: Performed by: EMERGENCY MEDICINE

## 2023-06-15 PROCEDURE — 25010000002 LORAZEPAM PER 2 MG: Performed by: EMERGENCY MEDICINE

## 2023-06-15 PROCEDURE — 71260 CT THORAX DX C+: CPT

## 2023-06-15 RX ORDER — IBUPROFEN 400 MG/1
800 TABLET ORAL ONCE
Status: COMPLETED | OUTPATIENT
Start: 2023-06-15 | End: 2023-06-15

## 2023-06-15 RX ORDER — NALOXONE HCL 0.4 MG/ML
0.5 VIAL (ML) INJECTION ONCE
Status: COMPLETED | OUTPATIENT
Start: 2023-06-15 | End: 2023-06-15

## 2023-06-15 RX ORDER — LORAZEPAM 2 MG/ML
1 INJECTION INTRAMUSCULAR ONCE
Status: COMPLETED | OUTPATIENT
Start: 2023-06-15 | End: 2023-06-15

## 2023-06-15 RX ADMIN — IOPAMIDOL 100 ML: 755 INJECTION, SOLUTION INTRAVENOUS at 20:09

## 2023-06-15 RX ADMIN — IOPAMIDOL 100 ML: 755 INJECTION, SOLUTION INTRAVENOUS at 20:06

## 2023-06-15 RX ADMIN — NALOXONE HYDROCHLORIDE 0.5 MG: 0.4 INJECTION, SOLUTION INTRAMUSCULAR; INTRAVENOUS; SUBCUTANEOUS at 23:17

## 2023-06-15 RX ADMIN — IBUPROFEN 800 MG: 400 TABLET ORAL at 17:31

## 2023-06-15 RX ADMIN — LORAZEPAM 1 MG: 2 INJECTION INTRAMUSCULAR; INTRAVENOUS at 18:09

## 2023-06-15 NOTE — ED NOTES
Went into pt room and introduced my self as yudy I am a forensic nurse. Pt became hostile, states I am calling my ride and leaving. I informed pt xray of shoulder had been ordered. Pt in room when xray came to get her, and test was performed.

## 2023-06-15 NOTE — ED PROVIDER NOTES
Time: 5:53 PM EDT  Date of encounter:  6/15/2023  Independent Historian/Clinical History and Information was obtained by:   Patient  Chief Complaint   Patient presents with    Fall       History is limited by: N/A    History of Present Illness:  Patient is a 38 y.o. year old female who presents to the emergency department for evaluation of right shoulder pain due to falling.  Patient states that she was in the woods yesterday with a friend when she started to fall, she reached in front of her to grab her shirt and as she was grabbing at the right hand went backwards and she heard 3 pops.  She ended up falling face forward on her knees denies hitting her face or hitting the back of her head.  Patient has laceration marks to her neck and states that her neck is swollen and she is having a hard time breathing and pain with swallowing.  Patient denies any trauma to her neck.   She has refused SANE services.     HPI    Patient Care Team  Primary Care Provider: Provider, No Known    Past Medical History:     No Known Allergies  Past Medical History:   Diagnosis Date    Anxiety     Bipolar 1 disorder     Depression     Lumbar back pain     PTSD (post-traumatic stress disorder)     Spleen anomaly      Past Surgical History:   Procedure Laterality Date    SPLENECTOMY      TUBAL ABDOMINAL LIGATION       Family History   Problem Relation Age of Onset    Diabetes Maternal Grandmother     Heart disease Maternal Grandmother     Cancer Maternal Grandfather     Heart disease Maternal Grandfather        Home Medications:  Prior to Admission medications    Not on File        Social History:   Social History     Tobacco Use    Smoking status: Every Day     Packs/day: 1.00     Years: 15.00     Pack years: 15.00     Types: Cigarettes    Smokeless tobacco: Never   Vaping Use    Vaping Use: Never used   Substance Use Topics    Alcohol use: No    Drug use: Never         Review of Systems:  Review of Systems   Constitutional: Negative.   "  HENT: Negative.  Negative for congestion.    Eyes: Negative.    Respiratory:  Positive for shortness of breath. Negative for cough.    Cardiovascular: Negative.    Gastrointestinal: Negative.  Negative for nausea and vomiting.   Endocrine: Negative.    Genitourinary: Negative.    Musculoskeletal:  Positive for arthralgias and neck pain.   Skin:  Positive for color change and wound.   Allergic/Immunologic: Negative.    Neurological: Negative.    Hematological: Negative.    Psychiatric/Behavioral: Negative.     All other systems reviewed and are negative.     Physical Exam:  /82   Pulse 90   Temp 98.4 °F (36.9 °C) (Oral)   Resp 16   Ht 157.5 cm (62\")   Wt 61.8 kg (136 lb 3.9 oz)   SpO2 99%   BMI 24.92 kg/m²     Physical Exam  Vitals and nursing note reviewed.   Constitutional:       Appearance: Normal appearance. She is normal weight.   HENT:      Head: Normocephalic and atraumatic.      Nose: Nose normal.      Mouth/Throat:      Mouth: Mucous membranes are moist.   Eyes:      Extraocular Movements: Extraocular movements intact.      Conjunctiva/sclera: Conjunctivae normal.      Pupils: Pupils are equal, round, and reactive to light.   Neck:     Cardiovascular:      Rate and Rhythm: Normal rate and regular rhythm.      Heart sounds: Normal heart sounds.   Pulmonary:      Effort: Pulmonary effort is normal. No respiratory distress.      Breath sounds: Normal breath sounds. No wheezing, rhonchi or rales.   Abdominal:      Palpations: Abdomen is soft.      Tenderness: There is no abdominal tenderness.   Musculoskeletal:         General: No swelling, deformity or signs of injury. Normal range of motion.      Right shoulder: No swelling, deformity, laceration or tenderness. Normal range of motion. Normal strength. Normal pulse.      Cervical back: Normal range of motion and neck supple.      Comments: Empty can test negative crossover test positive   Skin:     General: Skin is warm and dry.      Findings: " Erythema present.             Comments: Superficial abrasions to anterior neck  Neck swelling   Neurological:      General: No focal deficit present.      Mental Status: She is alert and oriented to person, place, and time.   Psychiatric:         Mood and Affect: Mood normal.         Behavior: Behavior normal.                Procedures:  Procedures      Medical Decision Making:      Comorbidities that affect care:        External Notes reviewed:    Previous Clinic Note:   note 9/20 for lip lac suture removal. Prior ED visits reviewed.       The following orders were placed and all results were independently analyzed by me:  Orders Placed This Encounter   Procedures    XR Shoulder 2+ View Right    CT Angiogram Neck    CT Chest With Contrast Diagnostic    Comprehensive Metabolic Panel    CBC Auto Differential    Inpatient Pulmonology Consult    Inpatient Hospitalist Consult    Initiate Observation Status    CBC & Differential       Medications Given in the Emergency Department:  Medications   ketorolac (TORADOL) injection 15 mg (has no administration in time range)   ibuprofen (ADVIL,MOTRIN) tablet 800 mg (800 mg Oral Given 6/15/23 1731)   LORazepam (ATIVAN) injection 1 mg (1 mg Intravenous Given 6/15/23 1809)   iopamidol (ISOVUE-370) 76 % injection 100 mL (100 mL Intravenous Given 6/15/23 2006)   iopamidol (ISOVUE-370) 76 % injection 100 mL (100 mL Intravenous Given 6/15/23 2009)   naloxone (NARCAN) injection 0.5 mg (0.5 mg Intravenous Given 6/15/23 2317)        ED Course:    The patient was initially evaluated in the triage area where orders were placed. The patient was later dispositioned by Carmella Hamlin PA-C.      The patient was advised to stay for completion of workup which includes but is not limited to communication of labs and radiological results, reassessment and plan. The patient was advised that leaving prior to disposition by a provider could result in critical findings that are not communicated to  the patient.     ED Course as of 06/16/23 0033   u Jacek 15, 2023   1719 X-ray tech states that there is air seen on imaging of her shoulder and is concerned for angulation.    Prior to this Megan Brooks went in the room and introduced herself as a forensic nurse and the patient freaked out and said that she was going to leave.  Patient denies any trauma to her neck.      Discussed with patient that no matter how the injury occurred during neck she does need to have further neck imaging done due to her shortness of breath and difficulty swallowing along with neck swelling.  Patient was agreeable to be moved to the other side to be monitored. [AJ]   1808 Patient will be moved to the main ED, report given to Frandy Hamlin.  Imaging discussed with Dr. Ignacio who recommends CT chest as well. [AJ]      ED Course User Index  [AJ] Tania Rodriguez PA-C       Labs:    Lab Results (last 24 hours)       ** No results found for the last 24 hours. **             Imaging:    XR Shoulder 2+ View Right    Result Date: 6/15/2023  PROCEDURE: XR SHOULDER 2+ VW RIGHT  COMPARISON: None  INDICATIONS: RIGHT SHOULDER PAIN; PATIENT STATES FALL INJURY IN WOODS X LAST NIGHT  FINDINGS:  There is no evidence for acute fracture or dislocation. The glenohumeral joint is intact. The acromioclavicular joint is intact. No focal osseous abnormalities are seen. The soft tissues appear unremarkable.        No acute osseous abnormality.      JORI LEAL MD       Electronically Signed and Approved By: JORI LEAL MD on 6/15/2023 at 18:01             CT Angiogram Neck    Result Date: 6/15/2023  PROCEDURE: CT ANGIOGRAM NECK  COMPARISON: 6/15/2023 (2003 hours).  INDICATIONS: Neck trauma; possible acute arterial injury.  PROTOCOL:   Standard CTA imaging protocol performed.    RADIATION:   Total DLP: 334 mGy*cm; total mAs: 2,447.   Automated exposure control was utilized to minimize radiation dose. CONTRAST: 100 mL Isovue 370 I.V.  TECHNIQUE:  After  obtaining the patient's consent, 1,010 CT/CTA images of the neck were obtained with intravenous contrast.  Multi-planar/3-D imaging was created and interpreted to optimize visualization of vascular anatomy.  Unless otherwise stated in this report, all vascular stenoses involving the internal carotid arteries, reported for this examination, are derived by dividing the lesion diameter by the diameter of the normal internal carotid artery more distally (that is, using NASCET criteria).  FINDINGS:  Again demonstrated is extensive subcutaneous gas (emphysema) involving the bilateral neck, extending superiorly to the skull base, greater on the right than the left.  There is pneumomediastinum.  A tiny left pneumothorax is seen.  No obvious acute fracture is identified.  Nonspecific retained secretions are seen within the upper trachea and may represent mucus.  Aspirated content cannot be excluded.  No definite focal sizable (drainable) ectopic fluid collection is seen within the imaged superior mediastinum.  No thoracic aortic aneurysm is suggested.  The maximum diameter of the ascending aorta is 3.7 cm.  No aortic dissection.  Venous contamination is seen on the exam, obscuring detail, especially on the right.  The great arteries are patent.  The vertebral arteries are patent; they are codominant.  No hemodynamically significant stenoses are seen.  No acute arterial injury.  No emergent large vessel occlusion.  No arterial dissection is seen.  There is also streak artifact from dental amalgam obscuring detail.  Mild-to-moderate age-indeterminate mucosal thickening involves the imaged paranasal sinuses.  No air-fluid interfaces are seen within the imaged paranasal sinuses.  There is slight chronic leftward nasal septal deviation with an associated tiny nasal spur.  No significant acute findings are seen with regard to the imaged brain, orbits, or middle ear clefts.  No pneumocephalus is suggested.  No orbital emphysema.          No acute arterial injury is suggested.  Please see above comments for further detail.     Please note that portions of this note were completed with a voice recognition program.  KARTHIKEYAN CURRIE JR, MD       Electronically Signed and Approved By: KARTHIKEYAN CURRIE JR, MD on 6/15/2023 at 22:07              CT Chest With Contrast Diagnostic    Result Date: 6/15/2023  PROCEDURE: CT CHEST W CONTRAST DIAGNOSTIC  COMPARISON:  [facility], CT, CT ANGIOGRAM NECK, 6/15/2023, 20:03.  [facility], CR, XR SHOULDER 2+ VW RIGHT, 6/15/2023, 17:36.  [facility], CT, ABDOMEN/PELVIS WITH CONTRAST, 7/06/2014, 17:20. INDICATIONS: subq air.  Laceration of the neck after falling.  Difficulty breathing.  TECHNIQUE: After obtaining the patient's consent, CT images were obtained with non-ionic intravenous contrast material.   PROTOCOL:   Standard imaging protocol performed    RADIATION:   DLP: 334mGy*cm   Automated exposure control was utilized to minimize radiation dose. CONTRAST: 100cc Isovue 370 I.V.  FINDINGS: No well-defined consolidations or significant pleural effusions are observed. No dominant pulmonary nodules or abnormal pulmonary masses are seen.  No significant hilar, mediastinal, or axillary lymphadenopathy is seen. A normal aortic arch branching pattern is noted. The heart appears normal in size. No pericardial effusion identified.  Pneumopericardium is present.  The thyroid gland is unremarkable. The esophagus is unremarkable.  Subcutaneous air is present within the lower neck extending to the supraclavicular region bilaterally, right greater than left.  Dissecting air is seen extending within the mediastinum diffusely.  No focal mediastinal collections identified.  The trachea and the bronchial tree appears unremarkable.  The limited evaluation of the upper abdomen demonstrates no definitive acute abnormalities.  No acute osseous abnormalities are seen.         1. Subcutaneous air  within the base of the neck extending to the supraclavicular region bilaterally, right greater than left with pneumo mediastinum and pneumopericardium diffusely.  The visualized portions of the trachea and the bronchial tree appear unremarkable.  No pneumothorax.  Otherwise, no acute cardiopulmonary process.  No acute osseous abnormality. 2. Ancillary findings as described above..     JORI LEAL MD       Electronically Signed and Approved By: JORI LEAL MD on 6/15/2023 at 20:27                Differential Diagnosis and Discussion:      Differential includes but not limited to spontaneous pneumomediastinum, recreational drug inhalation, asthma, COPD, thoracic trauma, esophageal rupture   Dyspnea: Differential diagnosis includes but is not limited to metabolic acidosis, neurological disorders, psychogenic, asthma, pneumothorax, upper airway obstruction, COPD, pneumonia, noncardiogenic pulmonary edema, interstitial lung disease, anemia, congestive heart failure, and pulmonary embolism  Extremity Pain: Differential diagnosis includes but is not limited to soft tissue sprain, tendonitis, tendon injury, dislocation, fracture, deep vein thrombosis, arterial insufficiency, osteoarthritis, bursitis, and ligamentous damage.    All X-rays impressions were independently interpreted by me.  CT scan radiology impression was interpreted by me.    [facility]           Patient Care Considerations:    Considered SANE evaluation but patient declined      Consultants/Shared Management Plan:    Hospitalist: I have discussed the case with Dr. Nino who agrees to accept the patient for admission.  Consultant: I have discussed the case with Dr. Sapp with pulmonology who states agreement with consultation and in agreement with OP referral upon d/c  I have discussed the case with Dr. Bird, supervising physician who states that the patient can be safely discharged with close follow up.    Social Determinants of Health:          Disposition  and Care Coordination:    Admit:   Through independent evaluation of the patient's history, physical, and imperical data, the patient meets criteria for observation/admission to the hospital.    Patient declined further attempts at blood work.         Final diagnoses:   Pneumomediastinum        ED Disposition       ED Disposition   Decision to Admit    Condition   --    Comment   Level of Care: Remote Telemetry [26]   Diagnosis: Pneumomediastinum [[id number]]                 This medical record created using voice recognition software.             Carmella Hamlin PA-C  06/16/23 0033

## 2023-06-15 NOTE — ED NOTES
"Patient states she did smoke, \"weed\" this am, \"to help with the pain.\" She states, \"A friend of a friend, that is homeless helped me up.\"   "

## 2023-06-16 ENCOUNTER — READMISSION MANAGEMENT (OUTPATIENT)
Dept: CALL CENTER | Facility: HOSPITAL | Age: 38
End: 2023-06-16
Payer: COMMERCIAL

## 2023-06-16 VITALS
TEMPERATURE: 97.9 F | OXYGEN SATURATION: 100 % | WEIGHT: 135.14 LBS | HEIGHT: 61 IN | HEART RATE: 97 BPM | SYSTOLIC BLOOD PRESSURE: 121 MMHG | DIASTOLIC BLOOD PRESSURE: 71 MMHG | RESPIRATION RATE: 16 BRPM | BODY MASS INDEX: 25.52 KG/M2

## 2023-06-16 PROBLEM — J98.2 PNEUMOMEDIASTINUM: Status: ACTIVE | Noted: 2023-06-16

## 2023-06-16 LAB
ALBUMIN SERPL-MCNC: 4 G/DL (ref 3.5–5.2)
ALBUMIN/GLOB SERPL: 1.4 G/DL
ALP SERPL-CCNC: 71 U/L (ref 39–117)
ALT SERPL W P-5'-P-CCNC: 18 U/L (ref 1–33)
AMMONIA BLD-SCNC: 33 UMOL/L (ref 11–51)
AMPHET+METHAMPHET UR QL: POSITIVE
ANION GAP SERPL CALCULATED.3IONS-SCNC: 10.5 MMOL/L (ref 5–15)
ARTERIAL PATENCY WRIST A: ABNORMAL
AST SERPL-CCNC: 17 U/L (ref 1–32)
BACTERIA UR QL AUTO: ABNORMAL /HPF
BARBITURATES UR QL SCN: NEGATIVE
BASE EXCESS BLDA CALC-SCNC: 1.9 MMOL/L (ref -2–2)
BASOPHILS # BLD AUTO: 0.04 10*3/MM3 (ref 0–0.2)
BASOPHILS NFR BLD AUTO: 0.3 % (ref 0–1.5)
BDY SITE: ABNORMAL
BENZODIAZ UR QL SCN: NEGATIVE
BILIRUB SERPL-MCNC: 0.4 MG/DL (ref 0–1.2)
BILIRUB UR QL STRIP: NEGATIVE
BUN SERPL-MCNC: 11 MG/DL (ref 6–20)
BUN/CREAT SERPL: 19.3 (ref 7–25)
CA-I BLDA-SCNC: 1.2 MMOL/L (ref 1.13–1.32)
CALCIUM SPEC-SCNC: 8.7 MG/DL (ref 8.6–10.5)
CANNABINOIDS SERPL QL: POSITIVE
CHLORIDE BLDA-SCNC: 104 MMOL/L (ref 98–106)
CHLORIDE SERPL-SCNC: 105 MMOL/L (ref 98–107)
CLARITY UR: ABNORMAL
CO2 SERPL-SCNC: 21.5 MMOL/L (ref 22–29)
COCAINE UR QL: NEGATIVE
COHGB MFR BLD: 0.8 % (ref 0–1.5)
COLOR UR: YELLOW
CREAT SERPL-MCNC: 0.57 MG/DL (ref 0.57–1)
D-LACTATE SERPL-SCNC: 1.5 MMOL/L (ref 0.5–2)
DEPRECATED RDW RBC AUTO: 54.3 FL (ref 37–54)
EGFRCR SERPLBLD CKD-EPI 2021: 121 ML/MIN/1.73
EOSINOPHIL # BLD AUTO: 0.16 10*3/MM3 (ref 0–0.4)
EOSINOPHIL NFR BLD AUTO: 1.4 % (ref 0.3–6.2)
ERYTHROCYTE [DISTWIDTH] IN BLOOD BY AUTOMATED COUNT: 15.7 % (ref 12.3–15.4)
FENTANYL UR-MCNC: NEGATIVE NG/ML
FHHB: 3.5 % (ref 0–5)
GAS FLOW AIRWAY: ABNORMAL L/MIN
GLOBULIN UR ELPH-MCNC: 2.8 GM/DL
GLUCOSE BLDA-MCNC: 103 MG/DL (ref 65–99)
GLUCOSE SERPL-MCNC: 104 MG/DL (ref 65–99)
GLUCOSE UR STRIP-MCNC: NEGATIVE MG/DL
HCO3 BLDA-SCNC: 27 MMOL/L (ref 22–26)
HCT VFR BLD AUTO: 39.6 % (ref 34–46.6)
HGB BLD-MCNC: 12.9 G/DL (ref 12–15.9)
HGB BLDA-MCNC: 13.2 G/DL (ref 11.7–14.6)
HGB UR QL STRIP.AUTO: ABNORMAL
HYALINE CASTS UR QL AUTO: ABNORMAL /LPF
IMM GRANULOCYTES # BLD AUTO: 0.02 10*3/MM3 (ref 0–0.05)
IMM GRANULOCYTES NFR BLD AUTO: 0.2 % (ref 0–0.5)
INHALED O2 CONCENTRATION: 21 %
KETONES UR QL STRIP: NEGATIVE
LACTATE BLDA-SCNC: 0.83 MMOL/L (ref 0.5–2)
LEUKOCYTE ESTERASE UR QL STRIP.AUTO: NEGATIVE
LYMPHOCYTES # BLD AUTO: 2.92 10*3/MM3 (ref 0.7–3.1)
LYMPHOCYTES NFR BLD AUTO: 25.5 % (ref 19.6–45.3)
MCH RBC QN AUTO: 30.7 PG (ref 26.6–33)
MCHC RBC AUTO-ENTMCNC: 32.6 G/DL (ref 31.5–35.7)
MCV RBC AUTO: 94.3 FL (ref 79–97)
METHADONE UR QL SCN: NEGATIVE
METHGB BLD QL: 0.2 % (ref 0–1.5)
MODALITY: ABNORMAL
MONOCYTES # BLD AUTO: 0.69 10*3/MM3 (ref 0.1–0.9)
MONOCYTES NFR BLD AUTO: 6 % (ref 5–12)
NEUTROPHILS NFR BLD AUTO: 66.6 % (ref 42.7–76)
NEUTROPHILS NFR BLD AUTO: 7.6 10*3/MM3 (ref 1.7–7)
NITRITE UR QL STRIP: POSITIVE
NOTE: ABNORMAL
NRBC BLD AUTO-RTO: 0 /100 WBC (ref 0–0.2)
OPIATES UR QL: POSITIVE
OXYCODONE UR QL SCN: NEGATIVE
OXYHGB MFR BLDV: 95.5 % (ref 94–99)
PCO2 BLDA: 44 MM HG (ref 35–45)
PH BLDA: 7.41 PH UNITS (ref 7.35–7.45)
PH UR STRIP.AUTO: 6 [PH] (ref 5–8)
PLATELET # BLD AUTO: 338 10*3/MM3 (ref 140–450)
PMV BLD AUTO: 10 FL (ref 6–12)
PO2 BLD: 438 MM[HG] (ref 0–500)
PO2 BLDA: 92 MM HG (ref 80–100)
POTASSIUM BLDA-SCNC: 3.76 MMOL/L (ref 3.5–5)
POTASSIUM SERPL-SCNC: 3.9 MMOL/L (ref 3.5–5.2)
PROT SERPL-MCNC: 6.8 G/DL (ref 6–8.5)
PROT UR QL STRIP: ABNORMAL
RBC # BLD AUTO: 4.2 10*6/MM3 (ref 3.77–5.28)
RBC # UR STRIP: ABNORMAL /HPF
REF LAB TEST METHOD: ABNORMAL
SAO2 % BLDCOA: 96.5 % (ref 95–99)
SODIUM BLDA-SCNC: 138.2 MMOL/L (ref 136–146)
SODIUM SERPL-SCNC: 137 MMOL/L (ref 136–145)
SP GR UR STRIP: >1.03 (ref 1–1.03)
SQUAMOUS #/AREA URNS HPF: ABNORMAL /HPF
TSH SERPL DL<=0.05 MIU/L-ACNC: 1.93 UIU/ML (ref 0.27–4.2)
UROBILINOGEN UR QL STRIP: ABNORMAL
WBC # UR STRIP: ABNORMAL /HPF
WBC NRBC COR # BLD: 11.43 10*3/MM3 (ref 3.4–10.8)

## 2023-06-16 PROCEDURE — 83050 HGB METHEMOGLOBIN QUAN: CPT | Performed by: STUDENT IN AN ORGANIZED HEALTH CARE EDUCATION/TRAINING PROGRAM

## 2023-06-16 PROCEDURE — 80307 DRUG TEST PRSMV CHEM ANLYZR: CPT | Performed by: EMERGENCY MEDICINE

## 2023-06-16 PROCEDURE — 85025 COMPLETE CBC W/AUTO DIFF WBC: CPT | Performed by: PHYSICIAN ASSISTANT

## 2023-06-16 PROCEDURE — 92610 EVALUATE SWALLOWING FUNCTION: CPT

## 2023-06-16 PROCEDURE — G0378 HOSPITAL OBSERVATION PER HR: HCPCS

## 2023-06-16 PROCEDURE — 82140 ASSAY OF AMMONIA: CPT | Performed by: STUDENT IN AN ORGANIZED HEALTH CARE EDUCATION/TRAINING PROGRAM

## 2023-06-16 PROCEDURE — 81001 URINALYSIS AUTO W/SCOPE: CPT | Performed by: STUDENT IN AN ORGANIZED HEALTH CARE EDUCATION/TRAINING PROGRAM

## 2023-06-16 PROCEDURE — 82805 BLOOD GASES W/O2 SATURATION: CPT | Performed by: STUDENT IN AN ORGANIZED HEALTH CARE EDUCATION/TRAINING PROGRAM

## 2023-06-16 PROCEDURE — 25010000002 KETOROLAC TROMETHAMINE PER 15 MG: Performed by: PHYSICIAN ASSISTANT

## 2023-06-16 PROCEDURE — 36415 COLL VENOUS BLD VENIPUNCTURE: CPT | Performed by: STUDENT IN AN ORGANIZED HEALTH CARE EDUCATION/TRAINING PROGRAM

## 2023-06-16 PROCEDURE — 97161 PT EVAL LOW COMPLEX 20 MIN: CPT

## 2023-06-16 PROCEDURE — 83605 ASSAY OF LACTIC ACID: CPT | Performed by: STUDENT IN AN ORGANIZED HEALTH CARE EDUCATION/TRAINING PROGRAM

## 2023-06-16 PROCEDURE — 82375 ASSAY CARBOXYHB QUANT: CPT | Performed by: STUDENT IN AN ORGANIZED HEALTH CARE EDUCATION/TRAINING PROGRAM

## 2023-06-16 PROCEDURE — 80053 COMPREHEN METABOLIC PANEL: CPT | Performed by: PHYSICIAN ASSISTANT

## 2023-06-16 PROCEDURE — 84443 ASSAY THYROID STIM HORMONE: CPT | Performed by: STUDENT IN AN ORGANIZED HEALTH CARE EDUCATION/TRAINING PROGRAM

## 2023-06-16 PROCEDURE — 36600 WITHDRAWAL OF ARTERIAL BLOOD: CPT | Performed by: STUDENT IN AN ORGANIZED HEALTH CARE EDUCATION/TRAINING PROGRAM

## 2023-06-16 RX ORDER — SODIUM CHLORIDE 0.9 % (FLUSH) 0.9 %
10 SYRINGE (ML) INJECTION AS NEEDED
Status: DISCONTINUED | OUTPATIENT
Start: 2023-06-16 | End: 2023-06-16 | Stop reason: HOSPADM

## 2023-06-16 RX ORDER — NICOTINE 21 MG/24HR
1 PATCH, TRANSDERMAL 24 HOURS TRANSDERMAL EVERY 24 HOURS
Status: DISCONTINUED | OUTPATIENT
Start: 2023-06-16 | End: 2023-06-16 | Stop reason: HOSPADM

## 2023-06-16 RX ORDER — SODIUM CHLORIDE 0.9 % (FLUSH) 0.9 %
10 SYRINGE (ML) INJECTION EVERY 12 HOURS SCHEDULED
Status: DISCONTINUED | OUTPATIENT
Start: 2023-06-16 | End: 2023-06-16 | Stop reason: HOSPADM

## 2023-06-16 RX ORDER — SODIUM CHLORIDE 9 MG/ML
40 INJECTION, SOLUTION INTRAVENOUS AS NEEDED
Status: DISCONTINUED | OUTPATIENT
Start: 2023-06-16 | End: 2023-06-16 | Stop reason: HOSPADM

## 2023-06-16 RX ORDER — SODIUM CHLORIDE, SODIUM LACTATE, POTASSIUM CHLORIDE, CALCIUM CHLORIDE 600; 310; 30; 20 MG/100ML; MG/100ML; MG/100ML; MG/100ML
100 INJECTION, SOLUTION INTRAVENOUS CONTINUOUS
Status: ACTIVE | OUTPATIENT
Start: 2023-06-16 | End: 2023-06-16

## 2023-06-16 RX ORDER — NITROGLYCERIN 0.4 MG/1
0.4 TABLET SUBLINGUAL
Status: DISCONTINUED | OUTPATIENT
Start: 2023-06-16 | End: 2023-06-16 | Stop reason: HOSPADM

## 2023-06-16 RX ORDER — KETOROLAC TROMETHAMINE 15 MG/ML
15 INJECTION, SOLUTION INTRAMUSCULAR; INTRAVENOUS ONCE
Status: COMPLETED | OUTPATIENT
Start: 2023-06-16 | End: 2023-06-16

## 2023-06-16 RX ADMIN — KETOROLAC TROMETHAMINE 15 MG: 15 INJECTION, SOLUTION INTRAMUSCULAR; INTRAVENOUS at 01:20

## 2023-06-16 RX ADMIN — NICOTINE 1 PATCH: 14 PATCH, EXTENDED RELEASE TRANSDERMAL at 03:59

## 2023-06-16 RX ADMIN — SODIUM CHLORIDE, POTASSIUM CHLORIDE, SODIUM LACTATE AND CALCIUM CHLORIDE 100 ML/HR: 600; 310; 30; 20 INJECTION, SOLUTION INTRAVENOUS at 02:04

## 2023-06-16 NOTE — PLAN OF CARE
Goal Outcome Evaluation:  Plan of Care Reviewed With: (P) patient           Outcome Evaluation: (P) Pt presents with normal ROM and BLE, and limitations seen during transfers and ambulation. Pt is safe to walk with nursing staff throughout hospital as frequently as she'd like. Pt did note she would like to speak to  regarding questions about transpirtation, and also to an MD regarding hearing/tinnitus symptoms that have been exacerbated sense recent hospitalization. Pt will be discharged from therapy services at this time. Recommend pt seek living accomodations with friends/family upon discharge.      PT Evaluation Complexity  History, PT Evaluation Complexity: (P) no personal factors and/or comorbidities  Examination of Body Systems (PT Eval Complexity): (P) total of 4 or more elements  Clinical Presentation (PT Evaluation Complexity): (P) stable  Clinical Decision Making (PT Evaluation Complexity): (P) low complexity  Overall Complexity (PT Evaluation Complexity): (P) low complexity

## 2023-06-16 NOTE — PLAN OF CARE
Goal Outcome Evaluation:  Plan of Care Reviewed With: patient        Progress: no change  Outcome Evaluation: pt new admit overnight. pt very lethargic, will wake up to voice but falls right back to sleep. was unable to finish full admission questions due to patient falling asleep, md notified, ABG and ammonia level ordered. VS stable. pt aox4 when awake. pt with subcutaneous emphysema around the neck. o2 remains 99% on room air.

## 2023-06-16 NOTE — H&P
[facility]  Date: 2023   Patient Name: Katie Villarreal  : 1985  MRN: [id number]  Primary Care Physician:  Carl, No Known  Date of admission: 6/15/2023    Subjective   Subjective     Chief Complaint: Fall, pain in her right shoulder    HPI:    Katie Villarreal is a 38 y.o. female  presented to the ED for evaluation of right shoulder pain due to a fall.  Patient states that she was in the woods yesterday with a friend when she started to fall, patient reached in front of her to grab her shirt as she was grabbing the right hand bent backwards and she had 3 pops, she ended up falling with face forward on her knees, denies hitting her face or hitting the back of her head.  Patient has laceration marks on her neck and states that her neck is swollen she is having a hard time breathing and pain with swallowing.  Denies any hit to her neck.  Denies any fevers, chills, nausea, vomiting, abdominal pain, focal weakness, numbness, tingling.  Has pain with movement of the right shoulder joint.    Upon arrival to the ED, vital signs temperature 98.4, pulse 99, respiratory 16, blood pressure 117/70 on room air saturating around 97%.  X-ray of her shoulder right showed no acute osseous abnormality.  CT chest with contrast showed subcutaneous air within the base of the neck extending to the supraclavicular region bilaterally right greater than left with pneumomediastinum pneumopericardium diffusely.  The visualized portions of the tracheobronchial tree appears unremarkable.  No pneumothorax noted.  Otherwise no acute cardiopulmonary process.  No acute osseous abnormality.  CTA neck showed no acute arterial injury.  Case has been discussed by ED physician with the pulmonologist on-call Dr. Sapp, recommended to admit the patient and will be evaluating the patient in the morning.      Personal History     Past Medical History:   Diagnosis Date    Anxiety     Bipolar 1  disorder     Depression     Lumbar back pain     PTSD (post-traumatic stress disorder)     Spleen anomaly          Past Surgical History:   Procedure Laterality Date    SPLENECTOMY      TUBAL ABDOMINAL LIGATION           Family History   Problem Relation Age of Onset    Diabetes Maternal Grandmother     Heart disease Maternal Grandmother     Cancer Maternal Grandfather     Heart disease Maternal Grandfather          Social History     Socioeconomic History    Marital status: Single   Tobacco Use    Smoking status: Every Day     Packs/day: 1.00     Years: 15.00     Pack years: 15.00     Types: Cigarettes    Smokeless tobacco: Never   Vaping Use    Vaping Use: Never used   Substance and Sexual Activity    Alcohol use: No    Drug use: Never    Sexual activity: Defer         Home Medications:       Allergies:  No Known Allergies    Review of Systems   All other systems reviewed and negative except as mentioned above in HPI    Objective   Objective     Vitals:   Temp:  [98.4 °F (36.9 °C)] 98.4 °F (36.9 °C)  Heart Rate:  [75-99] 79  Resp:  [16] 16  BP: (108-139)/(70-89) 137/86    Physical Exam    Constitutional: Awake, drowsy likely due to Ativan, no acute distress   Eyes: Pupils equal, sclerae anicteric, no conjunctival injection   HENT: NCAT, mucous membranes moist   Neck: Supple, no thyromegaly, no lymphadenopathy, trachea midline, subcutaneous crepitus noted on examination with palpation on the neck and in the anterior chest wall   Respiratory: Clear to auscultation bilaterally, nonlabored respirations    Cardiovascular: RRR, no murmurs, rubs, or gallops, palpable pedal pulses bilaterally   Gastrointestinal: Positive bowel sounds, soft, nontender, nondistended   Musculoskeletal: No bilateral ankle edema, no clubbing or cyanosis to extremities   Neurologic: Oriented x 3, speech clear   Skin: Linear laceration marks are noted on the neck    Result Review    Result Review:  I have personally reviewed the results from the  time of this admission to 6/16/2023 01:44 EDT and agree with these findings:  [x]  Laboratory  []  Microbiology  [x]  Radiology  []  EKG/Telemetry   []  Cardiology/Vascular   []  Pathology  []  Old records  []  Other:        Imaging Results (Last 24 Hours)       Procedure Component Value Units Date/Time    CT Angiogram Neck [[id number]] Collected: 06/15/23 2207     Updated: 06/15/23 2210    Narrative:      PROCEDURE: CT ANGIOGRAM NECK     COMPARISON: 6/15/2023 (2003 hours).     INDICATIONS: Neck trauma; possible acute arterial injury.     PROTOCOL:   Standard CTA imaging protocol performed.      RADIATION:   Total DLP: 334 mGy*cm; total mAs: 2,447.    Automated exposure control was utilized to minimize radiation dose.   CONTRAST: 100 mL Isovue 370 I.V.     TECHNIQUE:   After obtaining the patient's consent, 1,010 CT/CTA images of the neck were obtained with   intravenous contrast.  Multi-planar/3-D imaging was created and interpreted to optimize   visualization of vascular anatomy.  Unless otherwise stated in this report, all vascular stenoses   involving the internal carotid arteries, reported for this examination, are derived by dividing the   lesion diameter by the diameter of the normal internal carotid artery more distally (that is, using   NASCET criteria).     FINDINGS:   Again demonstrated is extensive subcutaneous gas (emphysema) involving the bilateral neck,   extending superiorly to the skull base, greater on the right than the left.  There is   pneumomediastinum.  A tiny left pneumothorax is seen.  No obvious acute fracture is identified.    Nonspecific retained secretions are seen within the upper trachea and may represent mucus.    Aspirated content cannot be excluded.  No definite focal sizable (drainable) ectopic fluid   collection is seen within the imaged superior mediastinum.  No thoracic aortic aneurysm is   suggested.  The maximum diameter of the ascending aorta is 3.7 cm.  No aortic dissection.   Venous   contamination is seen on the exam, obscuring detail, especially on the right.  The great arteries   are patent.  The vertebral arteries are patent; they are codominant.  No hemodynamically   significant stenoses are seen.  No acute arterial injury.  No emergent large vessel occlusion.  No   arterial dissection is seen.  There is also streak artifact from dental amalgam obscuring detail.    Mild-to-moderate age-indeterminate mucosal thickening involves the imaged paranasal sinuses.  No   air-fluid interfaces are seen within the imaged paranasal sinuses.  There is slight chronic   leftward nasal septal deviation with an associated tiny nasal spur.  No significant acute findings   are seen with regard to the imaged brain, orbits, or middle ear clefts.  No pneumocephalus is   suggested.  No orbital emphysema.         Impression:         No acute arterial injury is suggested.  Please see above comments for further detail.             Please note that portions of this note were completed with a voice recognition program.     KARTHIKEYAN CURRIE JR, MD         Electronically Signed and Approved By: KARTHIKEYAN CURRIE JR, MD on 6/15/2023 at 22:07                        CT Chest With Contrast Diagnostic [[id number]] Collected: 06/15/23 2027     Updated: 06/15/23 2030    Narrative:      PROCEDURE: CT CHEST W CONTRAST DIAGNOSTIC     COMPARISON:  [facility], CT, CT ANGIOGRAM NECK, 6/15/2023, 20:03.  [facility], CR, XR SHOULDER 2+ VW RIGHT, 6/15/2023, 17:36.  [facility], CT,   ABDOMEN/PELVIS WITH CONTRAST, 7/06/2014, 17:20.  INDICATIONS: subq air.  Laceration of the neck after falling.  Difficulty breathing.     TECHNIQUE: After obtaining the patient's consent, CT images were obtained with non-ionic   intravenous contrast material.       PROTOCOL:   Standard imaging protocol performed      RADIATION:   DLP: 334mGy*cm    Automated exposure control was utilized to minimize radiation dose.    CONTRAST: 100cc Isovue 370 I.V.     FINDINGS:  No well-defined consolidations or significant pleural effusions are observed. No dominant pulmonary   nodules or abnormal pulmonary masses are seen.      No significant hilar, mediastinal, or axillary lymphadenopathy is seen. A normal aortic arch   branching pattern is noted. The heart appears normal in size. No pericardial effusion identified.    Pneumopericardium is present.  The thyroid gland is unremarkable. The esophagus is unremarkable.    Subcutaneous air is present within the lower neck extending to the supraclavicular region   bilaterally, right greater than left.  Dissecting air is seen extending within the mediastinum   diffusely.  No focal mediastinal collections identified.  The trachea and the bronchial tree   appears unremarkable.     The limited evaluation of the upper abdomen demonstrates no definitive acute abnormalities.     No acute osseous abnormalities are seen.        Impression:         1. Subcutaneous air within the base of the neck extending to the supraclavicular region   bilaterally, right greater than left with pneumo mediastinum and pneumopericardium diffusely.  The   visualized portions of the trachea and the bronchial tree appear unremarkable.  No pneumothorax.    Otherwise, no acute cardiopulmonary process.  No acute osseous abnormality.  2. Ancillary findings as described above..            JORI LEAL MD         Electronically Signed and Approved By: JORI LEAL MD on 6/15/2023 at 20:27                     XR Shoulder 2+ View Right [[id number]] Collected: 06/15/23 1801     Updated: 06/15/23 1805    Narrative:      PROCEDURE: XR SHOULDER 2+ VW RIGHT     COMPARISON: None     INDICATIONS: RIGHT SHOULDER PAIN; PATIENT STATES FALL INJURY IN WOODS X LAST NIGHT     FINDINGS:   There is no evidence for acute fracture or dislocation. The glenohumeral joint is intact. The   acromioclavicular joint is intact. No focal osseous abnormalities are  seen. The soft tissues appear   unremarkable.       Impression:         No acute osseous abnormality.              JORI LEAL MD         Electronically Signed and Approved By: JORI LEAL MD on 6/15/2023 at 18:01                                   Assessment & Plan   Assessment / Plan     Assessment/Plan:   Fall  Mild shortness of breath  Pneumomediastinum  Pneumopericardium    Plan  Admit to observation, remote telemetry  Case has been discussed by the ED physician with the pulmonologist on-call Dr. Sapp, will be evaluating the patient in the morning  Pulmonology consult in the a.m.  N.p.o.  LR at 100 cc/h for next 12 hours  Follow-up on lactic acid levels, urinalysis, urine drug screen  Patient is currently homeless and surviving with help of friends      DVT prophylaxis:  No DVT prophylaxis order currently exists.    CODE STATUS:    Level Of Support Discussed With: Patient  Code Status (Patient has no pulse and is not breathing): CPR (Attempt to Resuscitate)  Medical Interventions (Patient has pulse or is breathing): Full Support      Admission Status:  I believe this patient meets observation status.    Part of this note may be an electronic transcription/translation of spoken language to printed text using the Dragon Dictation System    Carli Nino MD

## 2023-06-16 NOTE — CONSULTS
Discharge Planning Assessment   [place]     Patient Name: Katie Villarreal  MRN: [id number]  Today's Date: 6/16/2023    Admit Date: 6/15/2023    Plan: SW met with pt at bedside to assess discharge needs. Pt presented to the ED after sustaining a fall and having pain in shoulder. Pt also found with pneumomediastinum. Pt is currently homeless. States she was planning to stay with a friend at discharge, however is unable to get ahold of them. Pt asked about services for homeless individuals. [facility] that also offers case management services. Pt was not against this idea. Pt states she does receive a monthly disability check and would be able to afford an apartment. Pt asked if the hospital could help find her an apartment. SW notified pt that there are waiting lists and she stated she is currently on one. Notified pt that this is not something we can assist with as it is a longer process. DAVID notified pt a cab voucher can be left for [place] if she is unable to get ahold of friend, pt v/u. Will provide pt with Community Resource Guide upon discharge. Pt is also agreeable to being arranged with a PCP. Will follow.     Discharge Needs Assessment       Row Name 06/16/23 9267       Living Environment    Current Living Arrangements homeless    Primary Care Provided by self    Provides Primary Care For no one    Family Caregiver if Needed none    Quality of Family Relationships unable to assess       Resource/Environmental Concerns    Resource/Environmental Concerns environmental;financial       Transition Planning    Patient/Family Anticipates Transition to other (see comments);shelter    Patient/Family Anticipated Services at Transition community agency;    Transportation Anticipated public transportation       Discharge Needs Assessment    Readmission Within the Last 30 Days no previous admission in last 30 days    Concerns to be  Addressed basic needs;discharge planning    Anticipated Changes Related to Illness none    Current Discharge Risk lack of support system/caregiver;homeless;financial support inadequate;substance use/abuse                   Discharge Plan       Row Name 06/16/23 1762       Plan    Plan SW met with pt at bedside to assess discharge needs. Pt presented to the ED after sustaining a fall and having pain in shoulder. Pt also found with pneumomediastinum. Pt is currently homeless. States she was planning to stay with a friend at discharge, however is unable to get ahold of them. Pt asked about services for homeless individuals. DAVID notified pt of [facility] being the closest shelter that also offers case management services. Pt was not against this idea. Pt states she does receive a monthly disability check and would be able to afford an apartment. Pt asked if the hospital could help find her an apartment. SW notified pt that there are waiting lists and she stated she is currently on one. Notified pt that this is not something we can assist with as it is a longer process. DAVID notified pt a cab voucher can be left for [place] if she is unable to get ahold of friend, pt v/u. Will provide pt with Community Resource Guide upon discharge. Pt is also agreeable to being arranged with a PCP. Will follow.    Patient/Family in Agreement with Plan yes                Demographic Summary       Row Name 06/16/23 6468       General Information    Admission Type observation    Arrived From emergency department    Referral Source admission list    Reason for Consult discharge planning;housing concerns/homeless    Preferred Language Croatian              Functional Status       Row Name 06/16/23 6570       Functional Status    Usual Activity Tolerance good    Current Activity Tolerance good       Functional Status, IADL    Medications independent    Meal Preparation independent    Housekeeping independent    Laundry  independent    Shopping independent       Mental Status    General Appearance WDL WDL       Mental Status Summary    Recent Changes in Mental Status/Cognitive Functioning no changes       Employment/    Employment Status disabled              Psychosocial       Row Name 06/16/23 1336       Developmental Stage (Eriksson's)    Developmental Stage Stage 7 (35-65 years/Middle Adulthood) Generativity vs. Stagnation      Row Name 06/16/23 1335       Behavior WDL    Behavior WDL WDL       Emotion Mood WDL    Emotion/Mood/Affect WDL emotion mood    Emotion/Mood calm       Speech WDL    Speech WDL WDL       Perceptual State WDL    Perceptual State WDL WDL       Thought Process WDL    Thought Process WDL WDL       Intellectual Performance WDL    Intellectual Performance WDL WDL    Level of Consciousness Alert       Coping/Stress    Major Change/Loss/Stressor child(farshad);housing concerns;limited support system    Patient Personal Strengths positive attitude    Sources of Support unable to assess    Reaction to Health Status unable to assess    Understanding of Condition and Treatment unable to assess       Developmental Stage (Eriksson's)    Developmental Stage Stage 7 (35-65 years/Middle Adulthood) Generativity vs. Stagnation              Legal       Row Name 06/16/23 6216       Financial/Legal    Source of Income disability           SAM Mistry

## 2023-06-16 NOTE — PLAN OF CARE
Goal Outcome Evaluation:  Plan of Care Reviewed With: patient        Progress: improving  Outcome Evaluation: patient is alert and oriented x4. no complaints of pain throughout the shift. continuous fluids discontinued. patient is up ad khadar. no new issues at this time. possible dc tomorrow.

## 2023-06-16 NOTE — CONSULTS
Pulmonary / Critical Care Consult Note      Patient Name: Katie Villarreal  : 1985  MRN: [id number]  Primary Care Physician:  Provider, No Known  Referring Physician: Cristino Oconnor DO  Date of admission: 6/15/2023    Subjective   Subjective     Reason for Consult/ Chief Complaint:   Pneumothorax, pneumopericardium, pneumomediastinum    HPI:  Katie Villarreal is a 38 y.o. female came in with right shoulder pain after fall.  Patient reports she was out in the woods with a friend yesterday and she fell and felt something pop.  She then felt some fullness in her neck.  She does have laceration marks on her neck.  Denies any dyspnea or coughing.  Complains of some left-sided neck pain as well as chest pain down on the right side.  In the emergency department she was on room air.  She did have some mild dyspnea and reported just feeling uncomfortable.  Did have a chest CT done showing pneumopericardium, pneumomediastinum extending up to the base of the neck and tiny bilateral pneumothoraces.  She also reports feeling subcutaneous gas along her neck.  Her urine drug screen was positive for opioids, marijuana and amphetamines.    Review of Systems  Constitutional symptoms:  Denied complaints   Ear, nose, throat: Denied complaints  Cardiovascular:  Denied complaints  Respiratory: Dyspnea, otherwise denied complaints  Gastrointestinal: Denied complaints  Musculoskeletal: Chest pain, neck pain, otherwise denied complaints  Genitourinary: Denied complaints  Allergy / Immunology: Denied complaints  Hematologic: Denied complaints  Neurologic: Denied complaints  Skin: Denied complaints  Endocrine: Denied complaints  Psychiatric: Denied complaints      Personal History     Past Medical History:   Diagnosis Date    Anxiety     Bipolar 1 disorder     Depression     Lumbar back pain     PTSD (post-traumatic stress disorder)     Spleen anomaly        Past Surgical History:   Procedure Laterality Date    SPLENECTOMY       TUBAL ABDOMINAL LIGATION         Family History: family history includes Cancer in her maternal grandfather; Diabetes in her maternal grandmother; Heart disease in her maternal grandfather and maternal grandmother. Otherwise pertinent FHx was reviewed and not pertinent to current issue.    Social History:  reports that she has been smoking cigarettes. She has a 15.00 pack-year smoking history. She has never used smokeless tobacco. She reports that she does not drink alcohol and does not use drugs.    Home Medications:  None    Allergies:  No Known Allergies    Objective    Objective     Vitals:   Temp:  [97.7 °F (36.5 °C)-98.4 °F (36.9 °C)] 97.9 °F (36.6 °C)  Heart Rate:  [72-99] 91  Resp:  [15-16] 16  BP: (108-139)/(70-89) 119/77    Physical Exam:  Vital Signs Reviewed   General:  WDWN, Alert, NAD.    HEENT:  PERRL, EOMI.  OP, nares clear, no sinus tenderness  Neck:  Supple, no JVD, no thyromegaly, palpable gaps crepitus on bilateral aspect of neck  Lymph: no axillary, cervical, supraclavicular lymphadenopathy noted bilaterally  Chest:  good aeration, clear to auscultation bilaterally, tympanic to percussion bilaterally, no work of breathing noted  CV: RRR, no MGR, pulses 2+, equal.  Abd:  Soft, NT, ND, + BS, no HSM  EXT:  no clubbing, no cyanosis, no edema, no joint tenderness  Neuro:  A&Ox3, CN grossly intact, no focal deficits.  Skin: No rashes or lesions noted      Result Review    Result Review:  I have personally reviewed the results from the time of this admission to 6/16/2023 10:24 EDT and agree with these findings:  [x]  Laboratory  [x]  Microbiology  [x]  Radiology  [x]  EKG/Telemetry   []  Cardiology/Vascular   []  Pathology  []  Old records  []  Other:  Most notable findings include:  CT Chest With Contrast Diagnostic    Result Date: 6/15/2023  PROCEDURE: CT CHEST W CONTRAST DIAGNOSTIC  COMPARISON:  [facility], CT, CT ANGIOGRAM NECK, 6/15/2023, 20:03.  [facility], CR, XR  SHOULDER 2+ VW RIGHT, 6/15/2023, 17:36.  [facility], CT, ABDOMEN/PELVIS WITH CONTRAST, 7/06/2014, 17:20. INDICATIONS: subq air.  Laceration of the neck after falling.  Difficulty breathing.  TECHNIQUE: After obtaining the patient's consent, CT images were obtained with non-ionic intravenous contrast material.   PROTOCOL:   Standard imaging protocol performed    RADIATION:   DLP: 334mGy*cm   Automated exposure control was utilized to minimize radiation dose. CONTRAST: 100cc Isovue 370 I.V.  FINDINGS: No well-defined consolidations or significant pleural effusions are observed. No dominant pulmonary nodules or abnormal pulmonary masses are seen.  No significant hilar, mediastinal, or axillary lymphadenopathy is seen. A normal aortic arch branching pattern is noted. The heart appears normal in size. No pericardial effusion identified.  Pneumopericardium is present.  The thyroid gland is unremarkable. The esophagus is unremarkable.  Subcutaneous air is present within the lower neck extending to the supraclavicular region bilaterally, right greater than left.  Dissecting air is seen extending within the mediastinum diffusely.  No focal mediastinal collections identified.  The trachea and the bronchial tree appears unremarkable.  The limited evaluation of the upper abdomen demonstrates no definitive acute abnormalities.  No acute osseous abnormalities are seen.       Impression:   1. Subcutaneous air within the base of the neck extending to the supraclavicular region bilaterally, right greater than left with pneumo mediastinum and pneumopericardium diffusely.  The visualized portions of the trachea and the bronchial tree appear unremarkable.  No pneumothorax.  Otherwise, no acute cardiopulmonary process.  No acute osseous abnormality. 2. Ancillary findings as described above..     JORI LEAL MD       Electronically Signed and Approved By: JORI LEAL MD on 6/15/2023 at 20:27              Drug screen  positive for opioids, marijuana, amphetamines        Lab 06/16/23  0331 06/16/23  0106   WBC  --  11.43*   HEMOGLOBIN  --  12.9   HEMATOCRIT  --  39.6   PLATELETS  --  338   SODIUM  --  137   SODIUM, ARTERIAL 138.2  --    POTASSIUM  --  3.9   CHLORIDE  --  105   CO2  --  21.5*   BUN  --  11   CREATININE  --  0.57   GLUCOSE  --  104*   GLUCOSE, ARTERIAL 103*  --    CALCIUM  --  8.7   TOTAL PROTEIN  --  6.8   ALBUMIN  --  4.0   GLOBULIN  --  2.8         Assessment & Plan   Assessment / Plan     Active Hospital Problems:  Active Hospital Problems    Diagnosis     **Pneumomediastinum        Impression:  Traumatic pneumomediastinum  Traumatic pneumopericardium  Subcutaneous emphysema  Tiny bilateral pneumothoraces  Polysubstance abuse  Leukocytosis, likely reactive  Hyperglycemia, likely reactive  Going tobacco use of cigarettes    Plan:  Discussed with patient regarding mechanism of trauma in the setting.  Appears to have stabilized pneumomediastinum and pneumopericardium with tiny bilateral pneumothoraces.  Also discussed with the patient that since her subcutaneous emphysema is clinically improved, this injury that caused the above-mentioned pathology has likely healed  No intervention for tube thoracostomy or intervention at this time.  I will repeat chest x-ray in the morning and if stable the patient can be safely discharged with close follow-up with us  Smoking cessation encouraged.  Nicotine patch available  Don't do drugs  Pain control adequate    DVT prophylaxis:  Mechanical DVT prophylaxis orders are present.     Code Status and Medical Interventions:   Ordered at: 06/16/23 0048     Level Of Support Discussed With:    Patient     Code Status (Patient has no pulse and is not breathing):    CPR (Attempt to Resuscitate)     Medical Interventions (Patient has pulse or is breathing):    Full Support      We will have patient follow-up with us in 1 week.  We will check chest x-ray at that time to reevaluate for  pneumothoraces, pneumomediastinum and pneumopericardium      Labs, imaging, microbiology, notes and medications personally reviewed  Discussed with primary    Thank you for involving me in the care of this patient.    Electronically signed by Haresh Edward MD, 06/16/23, 10:27 AM EDT.

## 2023-06-16 NOTE — THERAPY EVALUATION
Acute Care - Physical Therapy Initial Evaluation   [facility]     Patient Name: Katie Villarreal  : 1985  MRN: [id number]  Today's Date: 2023      Visit Dx:     ICD-10-CM ICD-9-CM   1. Pneumomediastinum  J98.2 518.1   2. Dysphagia, unspecified type  R13.10 787.20   3. Difficulty walking  R26.2 719.7     Patient Active Problem List   Diagnosis    Cervical spondylosis with myelopathy    Incontinence    Chronic low back pain    Pneumomediastinum     Past Medical History:   Diagnosis Date    Anxiety     Bipolar 1 disorder     Depression     Lumbar back pain     PTSD (post-traumatic stress disorder)     Spleen anomaly      Past Surgical History:   Procedure Laterality Date    SPLENECTOMY      TUBAL ABDOMINAL LIGATION       PT Assessment (last 12 hours)       PT Evaluation and Treatment       [facility] Name 23 1523          Physical Therapy Time and Intention    Subjective Information no complaints (P)   -     Document Type evaluation (P)   -     Mode of Treatment individual therapy;physical therapy (P)   -     Patient Effort excellent (P)   -     Symptoms Noted During/After Treatment none (P)   -[facility] Name 23 1523          General Information    Patient Profile Reviewed yes (P)   -     Patient Observations alert;cooperative;agree to therapy (P)   -     Prior Level of Function independent:;all household mobility;community mobility (P)   -     Barriers to Rehab none identified (P)   -[facility] Name 23 1523          Living Environment    Current Living Arrangements homeless (P)   -     Primary Care Provided by self (P)   -[facility] Name 23 1523          Home Use of Assistive/Adaptive Equipment    Equipment Currently Used at Home none (P)   -[facility] Name 23 1523          Range of Motion (ROM)    Range of Motion bilateral lower extremities;ROM is WFL (P)   -[facility] Name 23 1523          Strength (Manual Muscle Testing)    Strength (Manual Muscle  Testing) bilateral lower extremities;strength is WFL (P)   -[facility] Name 06/16/23 1523          Bed Mobility    Bed Mobility bed mobility (all) activities (P)   -     All Activities, [place] (Bed Mobility) independent (P)   -JH       Row Name 06/16/23 1523          Transfers    Transfers sit-stand transfer;stand-sit transfer (P)   -JH       Row Name 06/16/23 1523          Sit-Stand Transfer    Sit-Stand [place] (Transfers) independent (P)   -[facility] Name 06/16/23 1523          Stand-Sit Transfer    Stand-Sit [place] (Transfers) independent (P)   -JH       Row Name 06/16/23 1523          Gait/Stairs (Locomotion)    Gait/Stairs Locomotion gait/ambulation independence (P)   -     [place] Level (Gait) standby assist (P)   -     Distance in Feet (Gait) 500 (P)   -     Pattern (Gait) step-through (P)   -     Deviations/Abnormal Patterns (Gait) gait speed decreased;stride length decreased (P)   -JH       Row Name 06/16/23 1523          Balance    Balance Assessment standing dynamic balance (P)   -     Dynamic Standing Balance standby assist (P)   -JH       Row Name 06/16/23 1523          Plan of Care Review    Plan of Care Reviewed With patient (P)   -     Outcome Evaluation Pt presents with normal ROM and BLE, and limitations seen during transfers and ambulation. Pt is safe to walk with nursing staff throughout hospital as frequently as she'd like. Pt did note she would like to speak to  regarding questions about transpirtation, and also to an MD regarding hearing/tinnitus symptoms that have been exacerbated sense recent hospitalization. Pt will be discharged from therapy services at this time. Recommend pt seek living accomodations with friends/family upon discharge. (P)   -[facility] Name 06/16/23 1523          Positioning and Restraints    Pre-Treatment Position in bed (P)   -     Post Treatment Position bed (P)   -     In Bed call light within  reach;encouraged to call for assist (P)   -       Row Name 06/16/23 1523          Therapy Assessment/Plan (PT)    Criteria for Skilled Interventions Met (PT) no problems identified which require skilled intervention (P)   -     Therapy Frequency (PT) evaluation only (P)   -       Row Name 06/16/23 1523          Therapy Plan Review/Discharge Plan (PT)    Therapy Plan Review (PT) evaluation/treatment results reviewed;participants voiced agreement with care plan;patient (P)   -               User Key  (r) = Recorded By, (t) = Taken By, (c) = Cosigned By      Initials Name Provider Type     Chaitanya Del Rio, JADEN Physical Therapist                    Physical Therapy Education       Title: PT OT SLP Therapies (Done)       Topic: Physical Therapy (Done)       Point: Mobility training (Done)       Learning Progress Summary             Patient Acceptance, E,TB, VU by  at 6/16/2023 1532                         Point: Home exercise program (Done)       Learning Progress Summary             Patient Acceptance, E,TB, VU by  at 6/16/2023 1532                         Point: Body mechanics (Done)       Learning Progress Summary             Patient Acceptance, E,TB, VU by  at 6/16/2023 1532                         Point: Precautions (Done)       Learning Progress Summary             Patient Acceptance, E,TB, VU by  at 6/16/2023 1532                                         User Key       Initials Effective Dates Name Provider Type [facility] 05/08/23 -  Chaitanya Del Rio, JADEN Physical Therapist PT                  PT Recommendation and Plan  Anticipated Discharge Disposition (PT): (P) home with assist, other (see comments) (friend or family home)  Therapy Frequency (PT): (P) evaluation only  Plan of Care Reviewed With: (P) patient  Outcome Evaluation: (P) Pt presents with normal ROM and BLE, and limitations seen during transfers and ambulation. Pt is safe to walk with nursing staff throughout hospital as frequently as  she'd like. Pt did note she would like to speak to  regarding questions about transpirtation, and also to an MD regarding hearing/tinnitus symptoms that have been exacerbated sense recent hospitalization. Pt will be discharged from therapy services at this time. Recommend pt seek living accomodations with friends/family upon discharge.   Outcome Measures       Row Name 06/16/23 1500             How much help from another person do you currently need...    Turning from your back to your side while in flat bed without using bedrails? 4 (P)   -JH      Moving from lying on back to sitting on the side of a flat bed without bedrails? 4 (P)   -JH      Moving to and from a bed to a chair (including a wheelchair)? 4 (P)   -JH      Standing up from a chair using your arms (e.g., wheelchair, bedside chair)? 4 (P)   -JH      Climbing 3-5 steps with a railing? 3 (P)   -JH      To walk in hospital room? 4 (P)   -JH      AM-PAC 6 Clicks Score (PT) 23 (P)   -         Functional Assessment    Outcome Measure Options AM-PAC 6 Clicks Basic Mobility (PT) (P)   -                User Key  (r) = Recorded By, (t) = Taken By, (c) = Cosigned By      Initials Name Provider Type    Chaitanya Martin PT Physical Therapist                     Time Calculation:    PT Charges       Row Name 06/16/23 1523             Time Calculation    PT Received On 06/16/23 (P)   -         Untimed Charges    PT Eval/Re-eval Minutes 48 (P)   -         Total Minutes    Untimed Charges Total Minutes 48 (P)   -       Total Minutes 48 (P)   -                User Key  (r) = Recorded By, (t) = Taken By, (c) = Cosigned By      Initials Name Provider Type    Chaitanya Martin PT Physical Therapist                  Therapy Charges for Today       Code Description Service Date Service Provider Modifiers Qty    41285767609 HC PT EVAL LOW COMPLEXITY 4 6/16/2023 Chaitanya Del Rio PT GP 1            PT G-Codes  Outcome Measure Options: (P) AM-PAC 6 Clicks  Basic Mobility (PT)  AM-PAC 6 Clicks Score (PT): (P) 23    Chaitanya Del Rio, PT  6/16/2023

## 2023-06-16 NOTE — NURSING NOTE
"Patient refusing to stay at the hospital due to us doing \"illegal things, drawing her blood without her knowledge, and giving her illegal drugs.\" She states that she no longer feels safe in the facility, and that she understands that she is leaving against medical advice. Charge nurse attempted to de-escalate the situation, patient was not receptive, still was adamant to leave. AMA paperwork signed and in the chart. Patient educated that it is important to stay for the XRAY in the morning, patient declined. IV removed and tip intact. Patient ambulated to elevator with all personal belongings and there was no need for security to escort.   "

## 2023-06-16 NOTE — THERAPY EVALUATION
Acute Care - Speech Language Pathology   Swallow Initial Evaluation  [facility]     Patient Name: Katie Villarreal  : 1985  MRN: [id number]  Today's Date: 2023               Admit Date: 6/15/2023    Visit Dx:     ICD-10-CM ICD-9-CM   1. Pneumomediastinum  J98.2 518.1   2. Dysphagia, unspecified type  R13.10 787.20     Patient Active Problem List   Diagnosis   • Cervical spondylosis with myelopathy   • Incontinence   • Chronic low back pain   • Pneumomediastinum     Past Medical History:   Diagnosis Date   • Anxiety    • Bipolar 1 disorder    • Depression    • Lumbar back pain    • PTSD (post-traumatic stress disorder)    • Spleen anomaly      Past Surgical History:   Procedure Laterality Date   • SPLENECTOMY     • TUBAL ABDOMINAL LIGATION         Inpatient Speech Pathology Dysphagia Evaluation        PAIN SCALE: None indicated.    PRECAUTIONS/CONTRAINDICATIONS: Standard    SUSPECTED ABUSE/NEGLECT/EXPLOITATION: None indicated.    SOCIAL/PSYCHOLOGICAL NEEDS/BARRIERS: None indicated.    PAST SOCIAL HISTORY: 38-year-old female lives at home    PRIOR FUNCTION: Independent    PATIENT GOALS/EXPECTATIONS: Return home    HISTORY: 38-year-old female the above diagnosis referred for speech therapy evaluation to assess for swallowing.  No previous speech pathology services are reported.  Patient stating no complaint of difficulty swallowing at this time.  Earlier had been reported with pain with swallow.    CURRENT DIET LEVEL: N.p.o.    OBJECTIVE:    TEST ADMINISTERED: Clinical dysphagia evaluation    COGNITION/SAFETY AWARENESS:  patient followed directions and answered questions without difficulty.    BEHAVIORAL OBSERVATIONS: Alert and cooperative    ORAL MOTOR EXAM:Within functional limits    VOICE QUALITY: Adequate    REFLEX EXAM: Deferred    POSTURE: Sitting upright in bed    FEEDING/SWALLOWING FUNCTION: Assessed with  thin liquids, puréed solids, crunchy solid.    CLINICAL OBSERVATIONS:  Thin liquid by cup and  by straw appeared timely with vocal quality remaining clear to cervical auscultation.  Purée solid with swallow completed with laryngeal elevation noted to palpation.  Crunchy solid with adequate chewing followed by swallow completed clearing the oral cavity.    DYSPHAGIA CRITERIA: Swallow appears functional for nutritional needs.  No overt clinical signs or symptoms of aspiration were noted at the bedside.    FUNCTIONAL ASSESSMENT INSTRUMENT: Patient currently scored a level 7 of 7 on Functional Communication Measures for swallowing indicating a 0% limitation in function.    ASSESSMENT/ PLAN OF CARE:  No direct speech therapy is recommended at this time for dysphagia. Recommend rereferral should patient demonstrate change in status.    RECOMMENDATIONS:   1.   DIET: Regular solids, thin liquid.    2.  POSITION: Positioning fully upright for all p.o. intake and 30 minutes following.    3.  COMPENSATORY STRATEGIES: Alternate small bites and small sips of solids and liquids at a slow rate.    Pt/responsible party agrees with plan of care and has been informed of all alternatives, risks and benefits.                              Anticipated Discharge Disposition (SLP): home (06/16/23 1145)                                                            EDUCATION  The patient has been educated in the following areas:   NPO rationale Modified Diet Instruction.              Time Calculation:    Time Calculation- SLP     Row Name 06/16/23 1145             Time Calculation- SLP    SLP Start Time 1045  -TB      SLP Stop Time 1145  -TB      SLP Time Calculation (min) 60 min  -TB      SLP Received On 06/16/23  -TB         Untimed Charges    SLP Eval/Re-eval  ST Eval Oral Pharyng Swallow - 48370  -TB      89665-YR Eval Oral Pharyng Swallow Minutes 60  -TB         Total Minutes    Untimed Charges Total Minutes 60  -TB       Total Minutes 60  -TB            User Key  (r) = Recorded By, (t) = Taken By, (c) = Cosigned By    Initials Name  Provider Type    TB Viridiana Zheng SLP Speech and Language Pathologist                Therapy Charges for Today     Code Description Service Date Service Provider Modifiers Qty    [id number] HC ST EVAL ORAL PHARYNG SWALLOW 4 6/16/2023 Viridiana Zheng SLP GN 1               YO Regan  6/16/2023

## 2023-06-16 NOTE — PLAN OF CARE
Goal Outcome Evaluation:  Plan of Care Reviewed With: patient      DYSPHAGIA CRITERIA: Swallow appears functional for nutritional needs.  No overt clinical signs or symptoms of aspiration were noted at the bedside.    FUNCTIONAL ASSESSMENT INSTRUMENT: Patient currently scored a level 7 of 7 on Functional Communication Measures for swallowing indicating a 0% limitation in function.    ASSESSMENT/ PLAN OF CARE:  No direct speech therapy is recommended at this time for dysphagia. Recommend rereferral should patient demonstrate change in status.    RECOMMENDATIONS:   1.   DIET: Regular solids, thin liquid.    2.  POSITION: Positioning fully upright for all p.o. intake and 30 minutes following.    3.  COMPENSATORY STRATEGIES: Alternate small bites and small sips of solids and liquids at a slow rate.

## 2023-06-17 NOTE — OUTREACH NOTE
Prep Survey    Flowsheet Row Responses   [facility] facility patient discharged from? [facility]   Is LACE score < 7 ? Yes   Eligibility Not Eligible   What are the reasons patient is not eligible? Other  [low risk for readmit]   Does the patient have one of the following disease processes/diagnoses(primary or secondary)? Other   Prep survey completed? Yes          Edith AYALA - Registered Nurse

## 2023-06-17 NOTE — DISCHARGE SUMMARY
[facility]         HOSPITALIST  DISCHARGE SUMMARY  PATIENT LEFT AMA             Patient Name: Katie Villarreal  : 1985  MRN: [id number]    Date of Admission: 6/15/2023  PATIENT LEFT AMA ON 6-  Primary Care Physician: Provider, No Known    Consults       Date and Time Order Name Status Description    2023  1:44 AM Inpatient Pulmonology Consult Completed             Active and Resolved Hospital Problems:  Active Hospital Problems    Diagnosis POA    **Pneumomediastinum [J98.2] Yes      Resolved Hospital Problems   No resolved problems to display.       Hospital Course     Hospital Course:  Katie Villarreal is a 38 y.o. female who presented to the ER with right shoulder pain after a fall while she was out in the woods using drugs.  Patient felt some fullness in her neck.  Patient was evaluated in the ER where she was found to have pneumo pericardium and pneumomediastinum extending up to the base of the neck and tiny bilateral pneumothoraces.  Patient's urine drug screen was positive for opiates, marijuana and amphetamines.  Pulmonary was consulted and hospitalist admitted patient to the hospital.  Patient was seen by pulmonary and they did not recommend any intervention for the findings.  They were planning to repeat a chest x-ray in the morning and if stable patient could be discharged home however patient decided to leave AGAINST MEDICAL ADVICE that evening.  Patient is alert and oriented x4.  Patient was seen earlier in the day by me and I had discussed the plan about repeating the chest x-ray however I was notified later in the evening that patient had decided to leave AGAINST MEDICAL ADVICE and understood the risks of leaving such as worsening pneumomediastinum and symptoms however she has elected to leave AGAINST MEDICAL ADVICE.  Please see the nursing note for full details.                Discharge Details        Discharge Medications      Patient Not Prescribed  Medications Upon Discharge         No Known Allergies    Discharge Disposition:  Left Against Medical Advice    Diet:  Hospital:No active diet order                Pertinent  and/or Most Recent Results     PROCEDURES:   NONE    LAB RESULTS:      Lab 06/16/23  0331 06/16/23  0240 06/16/23  0106   WBC  --   --  11.43*   HEMOGLOBIN  --   --  12.9   HEMATOCRIT  --   --  39.6   PLATELETS  --   --  338   NEUTROS ABS  --   --  7.60*   IMMATURE GRANS (ABS)  --   --  0.02   LYMPHS ABS  --   --  2.92   MONOS ABS  --   --  0.69   EOS ABS  --   --  0.16   MCV  --   --  94.3   LACTATE  --  1.5  --    LACTATE, ARTERIAL 0.83  --   --          Lab 06/16/23  0331 06/16/23  0106   SODIUM  --  137   SODIUM, ARTERIAL 138.2  --    POTASSIUM  --  3.9   CHLORIDE  --  105   CO2  --  21.5*   ANION GAP  --  10.5   BUN  --  11   CREATININE  --  0.57   EGFR  --  121.0   GLUCOSE  --  104*   GLUCOSE, ARTERIAL 103*  --    CALCIUM  --  8.7   IONIZED CALCIUM 1.20  --    TSH  --  1.930         Lab 06/16/23  0106   TOTAL PROTEIN 6.8   ALBUMIN 4.0   GLOBULIN 2.8   ALT (SGPT) 18   AST (SGOT) 17   BILIRUBIN 0.4   ALK PHOS 71                     Lab 06/16/23  0331   PH, ARTERIAL 7.405   PCO2, ARTERIAL 44.0   PO2 ART 92.0   O2 SATURATION ART 96.5   FIO2 21   HCO3 ART 27.0*   BASE EXCESS ART 1.9   CARBOXYHEMOGLOBIN 0.8     Brief Urine Lab Results  (Last result in the past 365 days)        Color   Clarity   Blood   Leuk Est   Nitrite   Protein   CREAT   Urine HCG        06/16/23 0132 Yellow   Cloudy   Moderate (2+)   Negative   Positive   Trace                 Microbiology Results (last 10 days)       ** No results found for the last 240 hours. **            XR Shoulder 2+ View Right    Result Date: 6/15/2023  Impression:   No acute osseous abnormality.      JORI LEAL MD       Electronically Signed and Approved By: JORI LEAL MD on 6/15/2023 at 18:01             CT Angiogram Neck    Result Date: 6/15/2023  Impression:   No acute arterial injury is  suggested.  Please see above comments for further detail.     Please note that portions of this note were completed with a voice recognition program.  KARTHIKEYAN CURRIE JR, MD       Electronically Signed and Approved By: KARTHIKEYAN CURRIE JR, MD on 6/15/2023 at 22:07              CT Chest With Contrast Diagnostic    Result Date: 6/15/2023  Impression:   1. Subcutaneous air within the base of the neck extending to the supraclavicular region bilaterally, right greater than left with pneumo mediastinum and pneumopericardium diffusely.  The visualized portions of the trachea and the bronchial tree appear unremarkable.  No pneumothorax.  Otherwise, no acute cardiopulmonary process.  No acute osseous abnormality. 2. Ancillary findings as described above..     JORI LEAL MD       Electronically Signed and Approved By: JORI LEAL MD on 6/15/2023 at 20:27                              Electronically signed by Cristino Oconnor DO, 06/17/23, 2:55 PM EDT.

## 2024-06-18 PROBLEM — H15.002 SCLERITIS OF LEFT EYE: Status: ACTIVE | Noted: 2024-06-18

## 2024-06-18 PROBLEM — H10.31 ACUTE BACTERIAL CONJUNCTIVITIS OF RIGHT EYE: Status: ACTIVE | Noted: 2024-06-18

## 2024-09-25 ENCOUNTER — HOSPITAL ENCOUNTER (EMERGENCY)
Facility: HOSPITAL | Age: 39
Discharge: HOME | End: 2024-09-25
Attending: EMERGENCY MEDICINE
Payer: COMMERCIAL

## 2024-09-25 VITALS
OXYGEN SATURATION: 97 % | BODY MASS INDEX: 24.02 KG/M2 | DIASTOLIC BLOOD PRESSURE: 80 MMHG | RESPIRATION RATE: 16 BRPM | HEART RATE: 80 BPM | HEIGHT: 62 IN | TEMPERATURE: 97.8 F | SYSTOLIC BLOOD PRESSURE: 111 MMHG | WEIGHT: 130.51 LBS

## 2024-09-25 DIAGNOSIS — S61.212A LACERATION OF RIGHT MIDDLE FINGER WITHOUT FOREIGN BODY WITHOUT DAMAGE TO NAIL, INITIAL ENCOUNTER: Primary | ICD-10-CM

## 2024-09-25 LAB
BASOPHILS # BLD AUTO: 0.11 10*3/MM3 (ref 0–0.2)
BASOPHILS NFR BLD AUTO: 1.1 % (ref 0–1.5)
BURR CELLS BLD QL SMEAR: NORMAL
DEPRECATED RDW RBC AUTO: 45.7 FL (ref 37–54)
ELLIPTOCYTES BLD QL SMEAR: NORMAL
EOSINOPHIL # BLD AUTO: 0.31 10*3/MM3 (ref 0–0.4)
EOSINOPHIL NFR BLD AUTO: 3.2 % (ref 0.3–6.2)
ERYTHROCYTE [DISTWIDTH] IN BLOOD BY AUTOMATED COUNT: 13.7 % (ref 12.3–15.4)
HCT VFR BLD AUTO: 34 % (ref 34–46.6)
HGB BLD-MCNC: 11.3 G/DL (ref 12–15.9)
HYPOCHROMIA BLD QL: NORMAL
IMM GRANULOCYTES # BLD AUTO: 0.02 10*3/MM3 (ref 0–0.05)
IMM GRANULOCYTES NFR BLD AUTO: 0.2 % (ref 0–0.5)
LYMPHOCYTES # BLD AUTO: 3.58 10*3/MM3 (ref 0.7–3.1)
LYMPHOCYTES NFR BLD AUTO: 36.7 % (ref 19.6–45.3)
MCH RBC QN AUTO: 30.2 PG (ref 26.6–33)
MCHC RBC AUTO-ENTMCNC: 33.2 G/DL (ref 31.5–35.7)
MCV RBC AUTO: 90.9 FL (ref 79–97)
MONOCYTES # BLD AUTO: 0.92 10*3/MM3 (ref 0.1–0.9)
MONOCYTES NFR BLD AUTO: 9.4 % (ref 5–12)
NEUTROPHILS NFR BLD AUTO: 4.82 10*3/MM3 (ref 1.7–7)
NEUTROPHILS NFR BLD AUTO: 49.4 % (ref 42.7–76)
NRBC BLD AUTO-RTO: 0 /100 WBC (ref 0–0.2)
PLATELET # BLD AUTO: 311 10*3/MM3 (ref 140–450)
PMV BLD AUTO: 10.2 FL (ref 6–12)
RBC # BLD AUTO: 3.74 10*6/MM3 (ref 3.77–5.28)
SMALL PLATELETS BLD QL SMEAR: NORMAL
SMUDGE CELLS BLD QL SMEAR: NORMAL
WBC NRBC COR # BLD AUTO: 9.76 10*3/MM3 (ref 3.4–10.8)

## 2024-09-25 PROCEDURE — 85025 COMPLETE CBC W/AUTO DIFF WBC: CPT | Performed by: REGISTERED NURSE

## 2024-09-25 PROCEDURE — 99283 EMERGENCY DEPT VISIT LOW MDM: CPT

## 2024-09-25 PROCEDURE — 36415 COLL VENOUS BLD VENIPUNCTURE: CPT

## 2024-09-25 PROCEDURE — 85007 BL SMEAR W/DIFF WBC COUNT: CPT | Performed by: REGISTERED NURSE

## 2024-09-25 RX ORDER — LIDOCAINE HYDROCHLORIDE 10 MG/ML
10 INJECTION, SOLUTION EPIDURAL; INFILTRATION; INTRACAUDAL; PERINEURAL ONCE
Status: COMPLETED | OUTPATIENT
Start: 2024-09-25 | End: 2024-09-25

## 2024-09-25 RX ORDER — GINSENG 100 MG
1 CAPSULE ORAL ONCE
Status: COMPLETED | OUTPATIENT
Start: 2024-09-25 | End: 2024-09-25

## 2024-09-25 RX ORDER — HYDROCODONE BITARTRATE AND ACETAMINOPHEN 5; 325 MG/1; MG/1
1 TABLET ORAL EVERY 6 HOURS PRN
Status: DISCONTINUED | OUTPATIENT
Start: 2024-09-25 | End: 2024-09-25 | Stop reason: HOSPADM

## 2024-09-25 RX ADMIN — BACITRACIN 0.9 G: 500 OINTMENT TOPICAL at 20:55

## 2024-09-25 RX ADMIN — LIDOCAINE HYDROCHLORIDE 10 ML: 10 INJECTION, SOLUTION EPIDURAL; INFILTRATION; INTRACAUDAL; PERINEURAL at 20:19

## 2024-09-25 RX ADMIN — HYDROCODONE BITARTRATE AND ACETAMINOPHEN 1 TABLET: 5; 325 TABLET ORAL at 20:23

## 2024-09-25 NOTE — ED PROVIDER NOTES
Time: 7:17 PM EDT  Date of encounter:  9/25/2024  Independent Historian/Clinical History and Information was obtained by:   Patient    History is limited by: N/A    Chief Complaint: Finger laceration      History of Present Illness:  Patient is a 39 y.o. year old female who presents to the emergency department via EMS for evaluation of laceration of the right middle finger.  Patient was seen at urgent care and was supposed to be transported to [facility], however was brought here instead.  States she thought it was because of her insurance coverage.  She reports that she was washing some dishes and did not realize that a mug was broken and cut her finger along the medial edge.      Patient Care Team  Primary Care Provider: Provider, No Known    Past Medical History:     No Known Allergies  Past Medical History:   Diagnosis Date    Anxiety     Bipolar 1 disorder     Deafness     Depression     Diabetes mellitus     Hypertension     Lumbar back pain     PTSD (post-traumatic stress disorder)     Spleen anomaly      Past Surgical History:   Procedure Laterality Date    SPLENECTOMY      TUBAL ABDOMINAL LIGATION       Family History   Problem Relation Age of Onset    Diabetes Maternal Grandmother     Heart disease Maternal Grandmother     Cancer Maternal Grandfather     Heart disease Maternal Grandfather        Home Medications:  Prior to Admission medications    Not on File        Social History:   Social History     Tobacco Use    Smoking status: Every Day     Current packs/day: 0.50     Average packs/day: 0.5 packs/day for 15.0 years (7.5 ttl pk-yrs)     Types: Cigarettes    Smokeless tobacco: Never   Vaping Use    Vaping status: Some Days    Substances: Nicotine, Flavoring   Substance Use Topics    Alcohol use: Yes     Comment: occ    Drug use: Yes     Types: Marijuana, Methamphetamines         Review of Systems:  Review of Systems   I performed a 10 point review of systems which was all negative, except for the  "positives found in the HPI above.  Physical Exam:  /80   Pulse 80   Temp 97.8 °F (36.6 °C) (Oral)   Resp 16   Ht 157.5 cm (62\")   Wt 59.2 kg (130 lb 8.2 oz)   LMP  (LMP Unknown)   SpO2 97%   Breastfeeding No   BMI 23.87 kg/m²     Physical Exam     General: Awake alert and appears in pain     HEENT: Head normocephalic atraumatic, eyes PERRLA EOMI, nose normal, oropharynx normal.     Neck: Supple full range of motion, no meningismus, no lymphadenopathy     Heart: Regular rate and rhythm, no murmurs or rubs, 2+ radial pulses bilaterally     Lungs: Clear to auscultation bilaterally without wheezes or crackles, no respiratory distress     Abdomen: Soft, nontender, nondistended, no rebound or guarding     Back exam:  No L-spine tenderness.  No rash.     Skin: Warm, dry, no rash, laceration to medial aspect of right middle finger, neurovascular status intact     Musculoskeletal: Normal range of motion, no lower extremity edema     Neurologic: Oriented x3, no motor deficits no sensory deficits     Psychiatric: Mood appears stable, no psychosis          Procedures:  Laceration Repair    Date/Time: 9/25/2024 8:42 PM    Performed by: Michelle Silva APRN  Authorized by: Jorge Carvalho DO    Consent:     Consent obtained:  Verbal    Consent given by:  Patient    Risks, benefits, and alternatives were discussed: yes      Risks discussed:  Infection, pain, need for additional repair, poor cosmetic result, poor wound healing, vascular damage, tendon damage and retained foreign body    Alternatives discussed:  No treatment, observation, delayed treatment and referral  Universal protocol:     Patient identity confirmed:  Verbally with patient  Anesthesia:     Anesthesia method:  Local infiltration    Local anesthetic:  Lidocaine 1% w/o epi  Laceration details:     Location:  Finger    Finger location:  R long finger    Length (cm):  1.5    Depth (mm):  2  Exploration:     Hemostasis achieved with:  " Epinephrine  Treatment:     Area cleansed with:  Povidone-iodine and saline    Amount of cleaning:  Standard    Irrigation solution:  Sterile saline    Irrigation method:  Syringe  Skin repair:     Repair method:  Sutures    Suture size:  5-0    Suture material:  Nylon    Suture technique:  Simple interrupted    Number of sutures:  6  Approximation:     Approximation:  Close  Repair type:     Repair type:  Simple  Post-procedure details:     Dressing:  Antibiotic ointment and non-adherent dressing    Procedure completion:  Tolerated well, no immediate complications        Medical Decision Making:      Comorbidities that affect care:    Diabetes, Hypertension    External Notes reviewed:          The following orders were placed and all results were independently analyzed by me:  Orders Placed This Encounter   Procedures    Laceration Repair    CBC Auto Differential    Scan Slide    Wound Dressing    CBC & Differential       Medications Given in the Emergency Department:  Medications   lidocaine PF 1% (XYLOCAINE) injection 10 mL (10 mL Injection Given 9/25/24 2019)   bacitracin 500 UNIT/GM ointment 0.9 g (0.9 g Topical Given 9/25/24 2055)        ED Course:         Labs:    Lab Results (last 24 hours)       Procedure Component Value Units Date/Time    CBC & Differential [[id number]]  (Abnormal) Collected: 09/25/24 2100    Specimen: Blood Updated: 09/25/24 2126    Narrative:      The following orders were created for panel order CBC & Differential.  Procedure                               Abnormality         Status                     ---------                               -----------         ------                     CBC Auto Differential[[id number]]        Abnormal            Final result               Scan Slide[[id number]]                                       Final result                 Please view results for these tests on the individual orders.    CBC Auto Differential [[id number]]  (Abnormal) Collected: 09/25/24  2100    Specimen: Blood Updated: 09/25/24 2126     WBC 9.76 10*3/mm3      RBC 3.74 10*6/mm3      Hemoglobin 11.3 g/dL      Hematocrit 34.0 %      MCV 90.9 fL      MCH 30.2 pg      MCHC 33.2 g/dL      RDW 13.7 %      RDW-SD 45.7 fl      MPV 10.2 fL      Platelets 311 10*3/mm3      Neutrophil % 49.4 %      Lymphocyte % 36.7 %      Monocyte % 9.4 %      Eosinophil % 3.2 %      Basophil % 1.1 %      Immature Grans % 0.2 %      Neutrophils, Absolute 4.82 10*3/mm3      Lymphocytes, Absolute 3.58 10*3/mm3      Monocytes, Absolute 0.92 10*3/mm3      Eosinophils, Absolute 0.31 10*3/mm3      Basophils, Absolute 0.11 10*3/mm3      Immature Grans, Absolute 0.02 10*3/mm3      nRBC 0.0 /100 WBC     Scan Slide [[id number]] Collected: 09/25/24 2100    Specimen: Blood Updated: 09/25/24 2126     Katy Cells Slight/1+     Elliptocytes Slight/1+     Hypochromia Slight/1+     Smudge Cells Slight/1+     Platelet Estimate Increased             Imaging:    No Radiology Exams Resulted Within Past 24 Hours      Differential Diagnosis and Discussion:    Laceration: Laceration was evaluated for arterial injury, ligamentous damage, and other neurovascular injury.        MDM  Number of Diagnoses or Management Options  Laceration of right middle finger without foreign body without damage to nail, initial encounter  Diagnosis management comments: The patient presented with a laceration in need of repair. See laceration repair note for details. The wound was irrigated with copious normal saline irrigation.  Nylon sutures were used to approximate the wound edges. Tetanus was not given. The patient tolerated the procedure well. Acute bleeding has ceased and the wound was approximated in the emergency department. Patient was counseled to keep the wound clean, dry, and out of the sun. Patient was counseled to change dressings daily. Patient was advised to return to the ED for worsening erythema, pain, swelling, fever, excessive drainage or signs of  infection. They were counseled to follow up for suture removal as described in the discharge instructions. Patient verbalizes understanding and agrees to follow up as instructed.       Amount and/or Complexity of Data Reviewed  Clinical lab tests: ordered and reviewed  Decide to obtain previous medical records or to obtain history from someone other than the patient: yes    Risk of Complications, Morbidity, and/or Mortality  Presenting problems: minimal  Diagnostic procedures: minimal  Management options: minimal    Patient Progress  Patient progress: improved                       Patient Care Considerations:    ANTIBIOTICS: I considered prescribing antibiotics as an outpatient however no bacterial focus of infection was found.      Consultants/Shared Management Plan:        Social Determinants of Health:    Patient is independent, reliable, and has access to care.       Disposition and Care Coordination:    Discharged: The patient is suitable and stable for discharge with no need for consideration of admission.        Final diagnoses:   Laceration of right middle finger without foreign body without damage to nail, initial encounter        ED Disposition       ED Disposition   Discharge    Condition   Stable    Comment   --               This medical record created using voice recognition software.             Michelle Silva, ALCIRA  09/26/24 0435

## 2024-09-26 NOTE — DISCHARGE INSTRUCTIONS
Keep your wounds clean and dry.  You may wash it with soap and water twice daily, then pat dry.  Sutures can removed in 10 days.    Monitor for signs of infection such as pus drainage, redness, fever, streaking.    Return for any new concerns.

## 2024-11-16 ENCOUNTER — HOSPITAL ENCOUNTER (EMERGENCY)
Facility: HOSPITAL | Age: 39
Discharge: HOME | End: 2024-11-16
Attending: EMERGENCY MEDICINE
Payer: COMMERCIAL

## 2024-11-16 VITALS
DIASTOLIC BLOOD PRESSURE: 63 MMHG | BODY MASS INDEX: 28.97 KG/M2 | WEIGHT: 157.41 LBS | RESPIRATION RATE: 18 BRPM | OXYGEN SATURATION: 99 % | HEART RATE: 87 BPM | TEMPERATURE: 97.9 F | SYSTOLIC BLOOD PRESSURE: 105 MMHG | HEIGHT: 62 IN

## 2024-11-16 DIAGNOSIS — R53.83 FATIGUE, UNSPECIFIED TYPE: ICD-10-CM

## 2024-11-16 DIAGNOSIS — Z79.899 ENCOUNTER FOR MEDICATION MANAGEMENT: Primary | ICD-10-CM

## 2024-11-16 PROCEDURE — 99282 EMERGENCY DEPT VISIT SF MDM: CPT

## 2024-11-16 RX ORDER — OLANZAPINE 10 MG/1
10 TABLET ORAL NIGHTLY
COMMUNITY
Start: 2024-11-06

## 2024-11-16 RX ORDER — CITALOPRAM HYDROBROMIDE 20 MG/1
20 TABLET ORAL DAILY
COMMUNITY
Start: 2024-11-14

## 2024-11-16 RX ORDER — NALTREXONE HYDROCHLORIDE 50 MG/1
50 TABLET, FILM COATED ORAL DAILY
COMMUNITY
Start: 2024-11-14

## 2024-11-16 RX ORDER — ATOMOXETINE 25 MG/1
25 CAPSULE ORAL DAILY
COMMUNITY
Start: 2024-11-06

## 2024-11-16 NOTE — Clinical Note
[facility] EMERGENCY ROOM  [address] [place] [address]  Phone: [phone]  Fax: [phone]    Katie Villarreal was seen and treated in our emergency department on 11/16/2024.  She may return to work on 11/17/2024.  May lie down to rest today and return to normal activity tomorrow 11/17/2024       Thank you for choosing [facility].    Tobias Styles PA-C

## 2024-11-16 NOTE — ED PROVIDER NOTES
Time: 10:18 AM EST  Date of encounter:  11/16/2024  Independent Historian/Clinical History and Information was obtained by:   Patient    History is limited by: N/A    Chief Complaint: Daytime sleepiness      History of Present Illness:  Patient is a 39 y.o. year old female who presents to the emergency department for evaluation of daytime sleepiness that began today.  [facility] states the [facility] gave her olanzapine 10 mg yesterday and again this morning.  Patient has been taking this medication for over 2 months but states the medication was switched to nighttime and then the nurse gave it again to her this morning.  Patient denies chest pain and shortness of breath.  Patient denies headache and vision changes.  Patient states she would like a note allowing her to lay down to rest today at the [facility] since they require a note.  Patient denies SI, HI, command hallucinations.      Patient Care Team  Primary Care Provider: Provider, Elena Known    Past Medical History:     No Known Allergies  Past Medical History:   Diagnosis Date    Anxiety     Bipolar 1 disorder     Deafness     Depression     Diabetes mellitus     Hypertension     Lumbar back pain     PTSD (post-traumatic stress disorder)     Spleen anomaly      Past Surgical History:   Procedure Laterality Date    SPLENECTOMY      TUBAL ABDOMINAL LIGATION       Family History   Problem Relation Age of Onset    Diabetes Maternal Grandmother     Heart disease Maternal Grandmother     Cancer Maternal Grandfather     Heart disease Maternal Grandfather        Home Medications:  Prior to Admission medications    Medication Sig Start Date End Date Taking? Authorizing Provider   atomoxetine (STRATTERA) 25 MG capsule Take 1 capsule by mouth Daily. 11/6/24  Yes ProviderJuliana MD   citalopram (CeleXA) 20 MG tablet Take 1 tablet by mouth Daily. 11/14/24  Yes Juliana Henry MD   naltrexone (DEPADE) 50 MG tablet Take 1 tablet by mouth Daily. 11/14/24  " Yes Provider, MD Juliana   OLANZapine (zyPREXA) 10 MG tablet Take 1 tablet by mouth Every Night. 11/6/24  Yes Provider, MD Juliana        Social History:   Social History     Tobacco Use    Smoking status: Every Day     Current packs/day: 0.50     Average packs/day: 0.5 packs/day for 15.0 years (7.5 ttl pk-yrs)     Types: Cigarettes    Smokeless tobacco: Never   Vaping Use    Vaping status: Some Days    Substances: Nicotine, Flavoring   Substance Use Topics    Alcohol use: Yes     Comment: occ    Drug use: Yes     Types: Marijuana, Methamphetamines         Review of Systems:  Review of Systems   Constitutional:  Negative for chills and fever.   HENT:  Negative for congestion, rhinorrhea and sore throat.    Eyes:  Negative for pain and visual disturbance.   Respiratory:  Negative for apnea, cough, chest tightness and shortness of breath.    Cardiovascular:  Negative for chest pain and palpitations.   Gastrointestinal:  Negative for abdominal pain, diarrhea, nausea and vomiting.   Genitourinary:  Negative for difficulty urinating and dysuria.   Musculoskeletal:  Negative for joint swelling and myalgias.   Skin:  Negative for color change.   Neurological:  Negative for seizures and headaches.   Psychiatric/Behavioral: Negative.     All other systems reviewed and are negative.       Physical Exam:  /63   Pulse 87   Temp 97.9 °F (36.6 °C) (Oral)   Resp 18   Ht 157.5 cm (62\")   Wt 71.4 kg (157 lb 6.5 oz)   SpO2 99%   BMI 28.79 kg/m²     Physical Exam  Vitals and nursing note reviewed.   Constitutional:       General: She is not in acute distress.     Appearance: Normal appearance. She is not toxic-appearing.   HENT:      Head: Normocephalic and atraumatic.      Jaw: There is normal jaw occlusion.   Eyes:      General: Lids are normal.      Extraocular Movements: Extraocular movements intact.      Conjunctiva/sclera: Conjunctivae normal.      Pupils: Pupils are equal, round, and reactive to light. "   Cardiovascular:      Rate and Rhythm: Normal rate and regular rhythm.      Pulses: Normal pulses.      Heart sounds: Normal heart sounds.   Pulmonary:      Effort: Pulmonary effort is normal. No respiratory distress.      Breath sounds: Normal breath sounds. No wheezing or rhonchi.   Abdominal:      General: Abdomen is flat.      Palpations: Abdomen is soft.      Tenderness: There is no abdominal tenderness. There is no guarding or rebound.   Musculoskeletal:         General: Normal range of motion.      Cervical back: Normal range of motion and neck supple.      Right lower leg: No edema.      Left lower leg: No edema.   Skin:     General: Skin is warm and dry.   Neurological:      Mental Status: She is alert and oriented to person, place, and time. Mental status is at baseline.   Psychiatric:         Mood and Affect: Mood normal.                  Procedures:  Procedures      Medical Decision Making:      Comorbidities that affect care:    Hypertension, diabetes    External Notes reviewed:          The following orders were placed and all results were independently analyzed by me:  No orders of the defined types were placed in this encounter.      Medications Given in the Emergency Department:  Medications - No data to display     ED Course:         Labs:    Lab Results (last 24 hours)       ** No results found for the last 24 hours. **             Imaging:    No Radiology Exams Resulted Within Past 24 Hours      Differential Diagnosis and Discussion:    Weakness: Based on the patient's history, signs, and symptoms, the diffential diagnosis includes but is not limited to meningitis, stroke, sepsis, subarachnoid hemorrhage, intracranial bleeding, encephalitis, acute uti, dehydration, MS, myasthenia gravis, Guillan H[place], migraine variant, neuromuscular disorders vertigo, electrolyte imbalance, and metabolic disorders.        MDM     Patient is resting comfortably at this time states she is asymptomatic but would  just like a note that allows her to lie down and rest today at the [facility].  Patient is drinking a Sprite at this time.  I instructed patient to return to ED if she develops any new or worsening symptoms.  Otherwise follow-up with PCP regarding medication management.  Patient states she understands and agrees to plan of care.                Patient Care Considerations:          Consultants/Shared Management Plan:    None    Social Determinants of Health:    Patient is independent, reliable, and has access to care.       Disposition and Care Coordination:    Discharged: The patient is suitable and stable for discharge with no need for consideration of admission.    I have explained the patient´s condition, diagnoses and treatment plan based on the information available to me at this time. I have answered questions and addressed any concerns. The patient has a good  understanding of the patient´s diagnosis, condition, and treatment plan as can be expected at this point. The vital signs have been stable. The patient´s condition is stable and appropriate for discharge from the emergency department.      The patient will pursue further outpatient evaluation with the primary care physician or other designated or consulting physician as outlined in the discharge instructions. They are agreeable to this plan of care and follow-up instructions have been explained in detail. The patient has received these instructions in written format and has expressed an understanding of the discharge instructions. The patient is aware that any significant change in condition or worsening of symptoms should prompt an immediate return to this or the closest emergency department or call to 911.  I have explained discharge medications and the need for follow up with the patient/caretakers. This was also printed in the discharge instructions. Patient was discharged with the following medications and follow up:      Medication List       No changes were made to your prescriptions during this visit.      Provider, No Known  [facility]  [place] [address]    Call in 1 day  To schedule follow-up regarding medication management       Final diagnoses:   Encounter for medication management   Fatigue, unspecified type        ED Disposition       ED Disposition   Discharge    Condition   Stable    Comment   --               This medical record created using voice recognition software.             Tobias Styles PA-C  11/16/24 1025

## 2024-11-20 ENCOUNTER — TELEPHONE (OUTPATIENT)
Dept: CASE MANAGEMENT | Facility: OTHER | Age: 39
End: 2024-11-20
Payer: COMMERCIAL

## 2024-11-20 NOTE — TELEPHONE ENCOUNTER
Outreach attempted x 3 for ED follow up and to offer and discuss case management services.   Alyssa Chapa RN ACM